# Patient Record
Sex: FEMALE | Race: OTHER | HISPANIC OR LATINO
[De-identification: names, ages, dates, MRNs, and addresses within clinical notes are randomized per-mention and may not be internally consistent; named-entity substitution may affect disease eponyms.]

---

## 2018-02-06 ENCOUNTER — APPOINTMENT (OUTPATIENT)
Dept: UROLOGY | Facility: CLINIC | Age: 48
End: 2018-02-06
Payer: COMMERCIAL

## 2018-02-06 VITALS — HEART RATE: 92 BPM | TEMPERATURE: 98.6 F | SYSTOLIC BLOOD PRESSURE: 116 MMHG | DIASTOLIC BLOOD PRESSURE: 79 MMHG

## 2018-02-06 DIAGNOSIS — N39.3 STRESS INCONTINENCE (FEMALE) (MALE): ICD-10-CM

## 2018-02-06 PROCEDURE — 99204 OFFICE O/P NEW MOD 45 MIN: CPT | Mod: 25

## 2018-02-06 PROCEDURE — 51798 US URINE CAPACITY MEASURE: CPT

## 2018-09-21 ENCOUNTER — APPOINTMENT (OUTPATIENT)
Dept: ORTHOPEDIC SURGERY | Facility: CLINIC | Age: 48
End: 2018-09-21
Payer: COMMERCIAL

## 2018-09-21 DIAGNOSIS — Z82.69 FAMILY HISTORY OF OTHER DISEASES OF THE MUSCULOSKELETAL SYSTEM AND CONNECTIVE TISSUE: ICD-10-CM

## 2018-09-21 DIAGNOSIS — Z78.9 OTHER SPECIFIED HEALTH STATUS: ICD-10-CM

## 2018-09-21 DIAGNOSIS — Z80.0 FAMILY HISTORY OF MALIGNANT NEOPLASM OF DIGESTIVE ORGANS: ICD-10-CM

## 2018-09-21 PROCEDURE — 20611 DRAIN/INJ JOINT/BURSA W/US: CPT | Mod: LT

## 2018-09-21 PROCEDURE — 99204 OFFICE O/P NEW MOD 45 MIN: CPT | Mod: 25

## 2018-11-02 ENCOUNTER — APPOINTMENT (OUTPATIENT)
Dept: ORTHOPEDIC SURGERY | Facility: CLINIC | Age: 48
End: 2018-11-02
Payer: COMMERCIAL

## 2018-11-02 VITALS — BODY MASS INDEX: 33.8 KG/M2 | HEIGHT: 64 IN | WEIGHT: 198 LBS

## 2018-11-02 DIAGNOSIS — S16.1XXA STRAIN OF MUSCLE, FASCIA AND TENDON AT NECK LEVEL, INITIAL ENCOUNTER: ICD-10-CM

## 2018-11-02 PROCEDURE — 99213 OFFICE O/P EST LOW 20 MIN: CPT

## 2018-12-04 ENCOUNTER — FORM ENCOUNTER (OUTPATIENT)
Age: 48
End: 2018-12-04

## 2018-12-05 ENCOUNTER — OUTPATIENT (OUTPATIENT)
Dept: OUTPATIENT SERVICES | Facility: HOSPITAL | Age: 48
LOS: 1 days | End: 2018-12-05

## 2018-12-05 ENCOUNTER — APPOINTMENT (OUTPATIENT)
Dept: MRI IMAGING | Facility: CLINIC | Age: 48
End: 2018-12-05
Payer: COMMERCIAL

## 2018-12-05 PROCEDURE — 73221 MRI JOINT UPR EXTREM W/O DYE: CPT | Mod: 26,LT

## 2018-12-14 ENCOUNTER — APPOINTMENT (OUTPATIENT)
Dept: ORTHOPEDIC SURGERY | Facility: CLINIC | Age: 48
End: 2018-12-14
Payer: COMMERCIAL

## 2018-12-14 VITALS — BODY MASS INDEX: 33.8 KG/M2 | WEIGHT: 198 LBS | HEIGHT: 64 IN

## 2018-12-14 PROCEDURE — 99214 OFFICE O/P EST MOD 30 MIN: CPT

## 2019-01-22 ENCOUNTER — APPOINTMENT (OUTPATIENT)
Dept: ORTHOPEDIC SURGERY | Facility: CLINIC | Age: 49
End: 2019-01-22
Payer: COMMERCIAL

## 2019-01-22 VITALS — BODY MASS INDEX: 35.68 KG/M2 | HEIGHT: 64 IN | WEIGHT: 209 LBS

## 2019-01-22 PROCEDURE — 99214 OFFICE O/P EST MOD 30 MIN: CPT

## 2019-01-23 NOTE — PHYSICAL EXAM
[de-identified] : The left shoulder has 170° passive forward elevation, 70° external rotation. She can fully actively raise the arm but this is painful. Strength of external rotation is normal but she does have subacromial tenderness and crepitus, positive for pain, and a positive impingement sign but no tenderness over the acromioclavicular joint.

## 2019-01-23 NOTE — DISCUSSION/SUMMARY
[Medication Risks Reviewed] : Medication risks reviewed [Surgical risks reviewed] : Surgical risks reviewed [de-identified] : Because she has had pain for over 6 months unresponsive to a full nonoperative treatment program including exercises, activity modifications, anti-inflammatory medications, and a cortisone injection, with an MRI documented full thickness tear of the rotator cuff, I believe she is an excellent candidate for arthroscopic repair of the rotator cuff.\par \par Using a shoulder model, I reviewed the anatomy of the rotator cuff and the pathophysiology of shoulder impingement syndrome ranging from inflammation, to fibrosis, to full thickness tears of the rotator cuff. I explained that a complete non-operative treatment program includes 3-4 months of activity modification to avoid those activities that exacerbate pain, shoulder rehabilitation exercises, anti-inflammatory medications, and a cortisone injection. Approximately 70% of patients are successfully treated in this way provided the rotator cuff is intact. Patients with full thickness tears of the rotator cuff are less likely to be adequately treated with non-operative treatment. I explained that shoulder arthroscopy with subacromial decompression provides excellent relief of pain for those patients who fail non-operative treatment. For those patients with full-thickness rotator cuff tears, subacromial decompression and repair of the rotator cuff is recommended.\par \par I reviewed that the indication for surgical treatment of rotator cuff disease is persistent pain that compromises activities of daily living and recreational activities following a full non-operative treatment program. Shoulder arthroscopy is performed under a regional interscalene block anesthesia on an ambulatory basis, and I reviewed the technique of subacromial bursa removal and anterior acromioplasty as well as details of repair of full-thickness rotator cuff tears. I also reviewed the importance of postoperative rehabilitation and the fact that recovery following subacromial decompression alone requires a minimum of 3-4 months before full overhead activity is resumed. Patient's requiring repair of full-thickness rotator cuff tears require up to 6-9 months of rehabilitation before returning to full and on limited overhead activities.\par \par Approximately 85-90% of patients achieve excellent pain relief and return of shoulder function following surgical treatment. I explained that the most important indicator of outcome is the size of the rotator cuff tear, if present. While relief of pain is consistent and predictable, shoulder strength for overhead activities is not and depends on the size of the rotator cuff tear itself and the quality of the rotator cuff muscles.\par \par Finally, I explained that a high percentage of rotator cuff tears that are repaired do not heal despite proper surgical technique and rehabilitation and that this is a function of the size of the rotator cuff tear and patient age, i.e. patients over 65 years old with larger tears have a higher percentage of non-healing of the tendons. However, up to 90% of patients whose rotator cuff repairs do not heal still have excellent outcomes and are quite happy with the procedure since they have excellent relief of pain and, therefore, there shoulder function is very much improved. Patients whose rotator cuff repairs do not heal may have some weakness with more vigorous overhead activities, but this weakness rarely compromises functional ability.\par \par I then summarized the indications for surgical treatment, the nature of the surgery, the alternatives of treatment, as well as the surgical risks which include infection, nerve injury, failure of healing of the rotator cuff tendons, and the possibility of unanticipated complications. I made sure that these issues were understood, and I then answered all questions.\par The risks, benefits, aftercare precautions, and alternatives of treatment were reviewed with her and all her questions were answered. She will schedule arthroscopic repair of the rotator cuff at her convenience. She knows that she will not be able to work for at least 6 weeks following surgery in an effort to allow healing of the repaired rotator cuff.\par \par Today her clinical left shoulder insert American is 44 on a scale of 100 indicating severe compromise of shoulder function.

## 2019-01-23 NOTE — HISTORY OF PRESENT ILLNESS
[de-identified] : Ms. Vogel returns today complaining of persistent left shoulder pain that has now lasted over 6 months. She continues to have pain in the left shoulder that interrupts sleep at night and compromise as overhead activities of daily living. 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 An MRI of the left shoulder performed on 12-5-18 shows a full-thickness tear of the rotator cuff.

## 2019-01-23 NOTE — CONSULT LETTER
[Dear  ___] : Dear  [unfilled], [FreeTextEntry1] : Today I had the pleasure of evaluating your very nice patient IRLANDA REEVES  who requested that I share my findings with you. I very much appreciate the referral. \par \par Please review my office note below and, needless to say, please call or email me with any questions or concerns.\par \par I appreciate the opportunity to participate in her care.\par \par Sincerely,\par \par Jose Madsen MD\par Director, Orthopaedic Surgery\par and Orthopaedic Strategic Initiatives \par [facility]\par Office: [phone]\par Cell: [phone]\par Email: [email or web address]\par Website: [email or web address] \par \par \par \par \par

## 2019-02-20 ENCOUNTER — APPOINTMENT (OUTPATIENT)
Age: 49
End: 2019-02-20
Payer: COMMERCIAL

## 2019-02-20 PROCEDURE — 29823 SHO ARTHRS SRG XTNSV DBRDMT: CPT | Mod: AS,LT

## 2019-02-20 PROCEDURE — 29826 SHO ARTHRS SRG DECOMPRESSION: CPT | Mod: LT

## 2019-02-20 PROCEDURE — 29827 SHO ARTHRS SRG RT8TR CUF RPR: CPT | Mod: AS,LT

## 2019-02-20 PROCEDURE — 29826 SHO ARTHRS SRG DECOMPRESSION: CPT | Mod: AS,LT

## 2019-02-20 PROCEDURE — 29827 SHO ARTHRS SRG RT8TR CUF RPR: CPT | Mod: LT

## 2019-02-20 PROCEDURE — 29823 SHO ARTHRS SRG XTNSV DBRDMT: CPT | Mod: LT

## 2019-03-01 ENCOUNTER — APPOINTMENT (OUTPATIENT)
Dept: ORTHOPEDIC SURGERY | Facility: CLINIC | Age: 49
End: 2019-03-01
Payer: COMMERCIAL

## 2019-03-01 PROCEDURE — 99024 POSTOP FOLLOW-UP VISIT: CPT

## 2019-03-03 NOTE — HISTORY OF PRESENT ILLNESS
[de-identified] : Ms. Vogel visits us today 9 days following left arthroscopic rotator cuff repair on 2-20-19

## 2019-03-03 NOTE — PHYSICAL EXAM
[de-identified] : The left shoulder incisions are healed and sutures were removed. External rotation 30°.

## 2019-03-03 NOTE — DISCUSSION/SUMMARY
[Medication Risks Reviewed] : Medication risks reviewed [Surgical risks reviewed] : Surgical risks reviewed [de-identified] : Doing well 9 days following arthroscopic repair of the left rotator cuff. I instructed her in pendulum exercises and advised her that she may allow the arch and ankle at her side at home but to continue to wear a sling when out of doors. She should return to in one month at which time forward elevation exercises will begin.

## 2019-03-03 NOTE — CONSULT LETTER
[Dear  ___] : Dear  [unfilled], [FreeTextEntry1] : Today I had the pleasure of evaluating your very nice patient IRLANDA REEVES  who requested that I share my findings with you. I very much appreciate the referral. \par \par Please review my office note below and, needless to say, please call or email me with any questions or concerns.\par \par She is doing very well 9 days following arthroscopic repair of her rotator cuff. I have included a copy of her operative report for your records.\par \par I appreciate the opportunity to participate in her care.\par \par Sincerely,\par \par Jose Madsen MD\par Director, Orthopaedic Surgery\par and Orthopaedic Strategic Initiatives \par [facility]\par Office: [phone]\par Cell: [phone]\par Email: [email or web address]\par Website: [email or web address] \par \par \par \par \par

## 2019-04-05 ENCOUNTER — APPOINTMENT (OUTPATIENT)
Dept: ORTHOPEDIC SURGERY | Facility: CLINIC | Age: 49
End: 2019-04-05
Payer: COMMERCIAL

## 2019-04-05 VITALS — WEIGHT: 209 LBS | HEIGHT: 64 IN | BODY MASS INDEX: 35.68 KG/M2

## 2019-04-05 PROCEDURE — 99024 POSTOP FOLLOW-UP VISIT: CPT

## 2019-04-05 RX ORDER — OXYCODONE AND ACETAMINOPHEN 5; 325 MG/1; MG/1
5-325 TABLET ORAL
Qty: 30 | Refills: 0 | Status: DISCONTINUED | COMMUNITY
Start: 2019-02-19 | End: 2019-04-05

## 2019-04-11 NOTE — CONSULT LETTER
[Dear  ___] : Dear  [unfilled], [FreeTextEntry1] : Today I had the pleasure of evaluating your very nice patient Sona Vogel  who requested that I share my findings with you. I very much appreciate the referral. \par \par Please review my office note below and, needless to say, please call or email me with any questions or concerns.\par \par I appreciate the opportunity to participate in her care.\par \par Sincerely,\par \par Jose Madsen MD\par Director, Orthopaedic Surgery\par and Orthopaedic Strategic Initiatives \par [facility]\par Office: [phone]\par Cell: [phone]\par Email: [email or web address] Website: [email or web address] \par \par \par \par

## 2019-04-11 NOTE — DISCUSSION/SUMMARY
[Medication Risks Reviewed] : Medication risks reviewed [Surgical risks reviewed] : Surgical risks reviewed [de-identified] : Doing well 6 weeks following arthroscopic left rotator cuff repair. I instructed her in-assisted range of motion exercises and gave her my home exercise sheet. I advised her to stop wearing the sling but to avoid active elevation above shoulder level until she has better strength.\par \par She should return in 6 weeks at which time a strengthening program will begin.

## 2019-04-11 NOTE — PHYSICAL EXAM
[de-identified] : The left shoulder has 140° passive forward elevation, 60° external rotation. There is no crepitus or pain with passive rotation of the glenohumeral joint and she has good strength of external rotation.

## 2019-04-11 NOTE — HISTORY OF PRESENT ILLNESS
[de-identified] : Ms. Vogel visits us today 6 weeks following left arthroscopic rotator cuff repair on 2-20-19 \par  \par

## 2019-05-17 ENCOUNTER — APPOINTMENT (OUTPATIENT)
Dept: ORTHOPEDIC SURGERY | Facility: CLINIC | Age: 49
End: 2019-05-17
Payer: COMMERCIAL

## 2019-05-17 VITALS — BODY MASS INDEX: 34.83 KG/M2 | WEIGHT: 204 LBS | HEIGHT: 64 IN

## 2019-05-17 PROCEDURE — 99024 POSTOP FOLLOW-UP VISIT: CPT

## 2019-05-18 NOTE — DISCUSSION/SUMMARY
[Surgical risks reviewed] : Surgical risks reviewed [Medication Risks Reviewed] : Medication risks reviewed [de-identified] : Doing extremely well 3 months following left rotator cuff repair. I instructed her in rotator cuff strengthening exercises that she can do at home and I gave her my home exercise sheet.\par \par I encouraged her to increase active use as comfort permits and to return for final followup in 3 months. I reminded her that full recovery following rotator cuff surgery require 6 months of postoperative rehabilitation, but that she is already doing extremely well at just 3 months.\par \par Today her clinical left shoulder [employer] score is 82 on the scale of 100 indicating good shoulder function and a marked improvement over her preoperative score of 44.

## 2019-05-18 NOTE — HISTORY OF PRESENT ILLNESS
[de-identified] : Ms. Vogel visits us today 3 months following left arthroscopic rotator cuff repair on 2-20-19 . 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

## 2019-05-18 NOTE — PHYSICAL EXAM
[de-identified] : Left shoulder has full active and passive range of motion without pain and normal strength of external rotation. She can fully actively raise the arm with minimal pain.

## 2019-05-18 NOTE — CONSULT LETTER
[Dear  ___] : Dear  [unfilled], [FreeTextEntry1] : Today I had the pleasure of evaluating your very nice patient IRLANDA REEVES  who requested that I share my findings with you. I very much appreciate the referral. \par \par Please review my office note below and, needless to say, please call or email me with any questions or concerns.\par \par I appreciate the opportunity to participate in her care.\par \par Sincerely,\par \par Jose Madsen MD\par Director, Orthopaedic Surgery\par and Orthopaedic Strategic Initiatives \[facility]\par Office: [phone]\par Cell: [phone]\par Email: [email or web address] Website: [email or web address] \par \par \par \par \par

## 2019-08-13 ENCOUNTER — APPOINTMENT (OUTPATIENT)
Dept: ORTHOPEDIC SURGERY | Facility: CLINIC | Age: 49
End: 2019-08-13
Payer: COMMERCIAL

## 2019-08-13 DIAGNOSIS — M75.102 UNSPECIFIED ROTATOR CUFF TEAR OR RUPTURE OF LEFT SHOULDER, NOT SPECIFIED AS TRAUMATIC: ICD-10-CM

## 2019-08-13 PROCEDURE — 99212 OFFICE O/P EST SF 10 MIN: CPT

## 2021-02-18 ENCOUNTER — APPOINTMENT (OUTPATIENT)
Dept: INTERVENTIONAL RADIOLOGY/VASCULAR | Facility: HOSPITAL | Age: 51
End: 2021-02-18
Payer: COMMERCIAL

## 2021-02-18 DIAGNOSIS — D64.9 ANEMIA, UNSPECIFIED: ICD-10-CM

## 2021-02-18 DIAGNOSIS — D25.9 LEIOMYOMA OF UTERUS, UNSPECIFIED: ICD-10-CM

## 2021-02-18 PROCEDURE — 99214 OFFICE O/P EST MOD 30 MIN: CPT | Mod: 95

## 2021-02-24 DIAGNOSIS — N80.0 ENDOMETRIOSIS OF UTERUS: ICD-10-CM

## 2021-02-24 PROBLEM — D25.9 FIBROID UTERUS: Status: ACTIVE | Noted: 2021-02-24

## 2021-02-24 PROBLEM — D64.9 ANEMIA: Status: ACTIVE | Noted: 2021-02-24

## 2021-02-24 NOTE — ASSESSMENT
[FreeTextEntry1] : 50 year old with presumed diagnosis of uterine adenomyosis.  Have scheduled MRI to evaluate uterus and determine if patient is candidate for UAE

## 2021-02-24 NOTE — CONSULT LETTER
[Dear  ___] : Dear ~POLO, [Consult Letter:] : I had the pleasure of evaluating your patient, [unfilled]. [Please see my note below.] : Please see my note below. [Consult Closing:] : Thank you very much for allowing me to participate in the care of this patient.  If you have any questions, please do not hesitate to contact me. [FreeTextEntry3] : Raghu Hoffmann MD, FSIR\par Chief Interventional Radiology\par [facility]\par [facility]\par  [Sincerely,] : Sincerely, [DrKenn  ___] : Dr. ENRIQUE [DrKenn ___] : Dr. ENRIQUE

## 2021-02-24 NOTE — HISTORY OF PRESENT ILLNESS
[FreeTextEntry1] : Ms Vogel is a 50 year old woman diagnosed with uterine adenomyosis approximately one year ago.  She has been experiencing menorrhagia since the end of 2020 lasting 7 days with clots.  She reports occasionally missing periods and has had 8 periods in the past 12 months.  Her severe anemia felt secondary to her menorrhagia is being treated with IV Iron infusion by Dr Ballard.  Denies metrorrhagia [Home] : at home, [unfilled] , at the time of the visit. [Medical Office: ([facility])___] : at the medical office located in  [Verbal consent obtained from patient] : the patient, [unfilled] [Menorrhagia] : ~T menorrhagia [Pelvic Pain] : pelvic pain [Urinary Frequency] : no change in urinary frequency [Pressure] : no pressure [Bloating] : no bloating [Myomectomy] : no myomectomy [D & C] : no dilation and curettage [Last Menstrual Period (___)] : Last menstrual period [unfilled] [Bleeding In Between Periods] : no bleeding in between periods [Menopausal Symptoms] : no complaints of menopausal symptoms [Post Menopause] : not post menopausal [G ___] : G [unfilled] [P___] : P [unfilled] [Vaginal___] : vaginal [unfilled] [Plans for future pregnancies within 2 years] : does not plan to have future pregnancies within 2 years [Surgically Sterile] : surgically sterile [Stable] : stable [de-identified] : uterues 9.9 x 5.9 x 6.4 with heterogeneous uterus no discrete fibroid.

## 2021-03-08 ENCOUNTER — OUTPATIENT (OUTPATIENT)
Dept: OUTPATIENT SERVICES | Facility: HOSPITAL | Age: 51
LOS: 1 days | End: 2021-03-08
Payer: COMMERCIAL

## 2021-03-08 ENCOUNTER — RESULT REVIEW (OUTPATIENT)
Age: 51
End: 2021-03-08

## 2021-03-08 ENCOUNTER — APPOINTMENT (OUTPATIENT)
Dept: MRI IMAGING | Facility: HOSPITAL | Age: 51
End: 2021-03-08
Payer: COMMERCIAL

## 2021-03-08 PROCEDURE — 72197 MRI PELVIS W/O & W/DYE: CPT | Mod: 26

## 2021-03-08 PROCEDURE — 72197 MRI PELVIS W/O & W/DYE: CPT

## 2021-03-08 PROCEDURE — A9585: CPT

## 2021-03-20 ENCOUNTER — LABORATORY RESULT (OUTPATIENT)
Age: 51
End: 2021-03-20

## 2021-03-23 ENCOUNTER — OUTPATIENT (OUTPATIENT)
Dept: OUTPATIENT SERVICES | Facility: HOSPITAL | Age: 51
LOS: 1 days | End: 2021-03-23
Payer: COMMERCIAL

## 2021-03-23 ENCOUNTER — RESULT REVIEW (OUTPATIENT)
Age: 51
End: 2021-03-23

## 2021-03-23 ENCOUNTER — APPOINTMENT (OUTPATIENT)
Dept: INTERVENTIONAL RADIOLOGY/VASCULAR | Facility: HOSPITAL | Age: 51
End: 2021-03-23
Payer: COMMERCIAL

## 2021-03-23 PROCEDURE — 37242 VASC EMBOLIZE/OCCLUDE ARTERY: CPT

## 2021-03-23 PROCEDURE — 36247 INS CATH ABD/L-EXT ART 3RD: CPT

## 2021-03-23 PROCEDURE — C1887: CPT

## 2021-03-23 PROCEDURE — C1769: CPT

## 2021-03-23 PROCEDURE — C1894: CPT

## 2021-03-23 PROCEDURE — C1889: CPT

## 2021-03-23 RX ORDER — ONDANSETRON 8 MG/1
1 TABLET, FILM COATED ORAL
Qty: 9 | Refills: 0
Start: 2021-03-23 | End: 2021-03-25

## 2021-03-23 RX ORDER — OXYCODONE HYDROCHLORIDE 5 MG/1
1 TABLET ORAL
Qty: 20 | Refills: 0
Start: 2021-03-23 | End: 2021-03-27

## 2021-03-23 RX ORDER — IBUPROFEN 200 MG
1 TABLET ORAL
Qty: 40 | Refills: 0
Start: 2021-03-23 | End: 2021-04-01

## 2021-04-16 ENCOUNTER — APPOINTMENT (OUTPATIENT)
Dept: INTERVENTIONAL RADIOLOGY/VASCULAR | Facility: HOSPITAL | Age: 51
End: 2021-04-16

## 2021-07-06 ENCOUNTER — OUTPATIENT (OUTPATIENT)
Dept: OUTPATIENT SERVICES | Facility: HOSPITAL | Age: 51
LOS: 1 days | End: 2021-07-06
Payer: COMMERCIAL

## 2021-07-06 ENCOUNTER — RESULT REVIEW (OUTPATIENT)
Age: 51
End: 2021-07-06

## 2021-07-06 ENCOUNTER — APPOINTMENT (OUTPATIENT)
Dept: ULTRASOUND IMAGING | Facility: HOSPITAL | Age: 51
End: 2021-07-06

## 2021-07-06 PROCEDURE — 76830 TRANSVAGINAL US NON-OB: CPT | Mod: 26

## 2021-07-06 PROCEDURE — 76830 TRANSVAGINAL US NON-OB: CPT

## 2021-07-06 PROCEDURE — 76856 US EXAM PELVIC COMPLETE: CPT | Mod: 26,59

## 2021-07-06 PROCEDURE — 76856 US EXAM PELVIC COMPLETE: CPT

## 2024-04-02 ENCOUNTER — APPOINTMENT (OUTPATIENT)
Dept: ORTHOPEDIC SURGERY | Facility: CLINIC | Age: 54
End: 2024-04-02
Payer: COMMERCIAL

## 2024-04-02 VITALS — BODY MASS INDEX: 31.92 KG/M2 | HEIGHT: 64 IN | WEIGHT: 187 LBS

## 2024-04-02 PROCEDURE — 20610 DRAIN/INJ JOINT/BURSA W/O US: CPT | Mod: RT

## 2024-04-02 PROCEDURE — 99204 OFFICE O/P NEW MOD 45 MIN: CPT | Mod: 25

## 2024-04-02 RX ORDER — MELOXICAM 15 MG/1
15 TABLET ORAL
Qty: 30 | Refills: 1 | Status: ACTIVE | COMMUNITY
Start: 2024-04-02 | End: 1900-01-01

## 2024-04-02 RX ORDER — LOSARTAN POTASSIUM AND HYDROCHLOROTHIAZIDE 25; 100 MG/1; MG/1
100-25 TABLET ORAL
Refills: 0 | Status: ACTIVE | COMMUNITY

## 2024-04-02 RX ORDER — HYDROCHLOROTHIAZIDE 12.5 MG/1
CAPSULE ORAL
Refills: 0 | Status: DISCONTINUED | COMMUNITY
End: 2024-04-02

## 2024-04-02 RX ORDER — AMLODIPINE BESYLATE 10 MG/1
10 TABLET ORAL
Qty: 30 | Refills: 0 | Status: DISCONTINUED | COMMUNITY
Start: 2019-02-19 | End: 2024-04-02

## 2024-05-16 ENCOUNTER — APPOINTMENT (OUTPATIENT)
Dept: ORTHOPEDIC SURGERY | Facility: CLINIC | Age: 54
End: 2024-05-16
Payer: COMMERCIAL

## 2024-05-16 VITALS — WEIGHT: 187 LBS | HEIGHT: 64 IN | BODY MASS INDEX: 31.92 KG/M2

## 2024-05-16 DIAGNOSIS — M75.41 IMPINGEMENT SYNDROME OF RIGHT SHOULDER: ICD-10-CM

## 2024-05-16 DIAGNOSIS — M75.121 COMPLETE ROTATOR CUFF TEAR OR RUPTURE OF RIGHT SHOULDER, NOT SPECIFIED AS TRAUMATIC: ICD-10-CM

## 2024-05-16 PROCEDURE — 99212 OFFICE O/P EST SF 10 MIN: CPT

## 2024-05-31 ENCOUNTER — OUTPATIENT (OUTPATIENT)
Dept: OUTPATIENT SERVICES | Facility: HOSPITAL | Age: 54
LOS: 1 days | End: 2024-05-31

## 2024-05-31 ENCOUNTER — APPOINTMENT (OUTPATIENT)
Dept: ORTHOPEDIC SURGERY | Facility: CLINIC | Age: 54
End: 2024-05-31
Payer: COMMERCIAL

## 2024-05-31 ENCOUNTER — APPOINTMENT (OUTPATIENT)
Dept: MRI IMAGING | Facility: CLINIC | Age: 54
End: 2024-05-31
Payer: COMMERCIAL

## 2024-05-31 ENCOUNTER — RESULT REVIEW (OUTPATIENT)
Age: 54
End: 2024-05-31

## 2024-05-31 ENCOUNTER — OUTPATIENT (OUTPATIENT)
Dept: OUTPATIENT SERVICES | Facility: HOSPITAL | Age: 54
LOS: 1 days | End: 2024-05-31
Payer: COMMERCIAL

## 2024-05-31 VITALS
SYSTOLIC BLOOD PRESSURE: 112 MMHG | HEIGHT: 64 IN | BODY MASS INDEX: 31.92 KG/M2 | HEART RATE: 61 BPM | OXYGEN SATURATION: 99 % | WEIGHT: 187 LBS | DIASTOLIC BLOOD PRESSURE: 77 MMHG

## 2024-05-31 DIAGNOSIS — Z80.0 FAMILY HISTORY OF MALIGNANT NEOPLASM OF DIGESTIVE ORGANS: ICD-10-CM

## 2024-05-31 DIAGNOSIS — M23.300 OTHER MENISCUS DERANGEMENTS, UNSPECIFIED LATERAL MENISCUS, RIGHT KNEE: ICD-10-CM

## 2024-05-31 DIAGNOSIS — M17.11 UNILATERAL PRIMARY OSTEOARTHRITIS, RIGHT KNEE: ICD-10-CM

## 2024-05-31 DIAGNOSIS — Z86.79 PERSONAL HISTORY OF OTHER DISEASES OF THE CIRCULATORY SYSTEM: ICD-10-CM

## 2024-05-31 PROCEDURE — 72170 X-RAY EXAM OF PELVIS: CPT | Mod: 26

## 2024-05-31 PROCEDURE — 73564 X-RAY EXAM KNEE 4 OR MORE: CPT

## 2024-05-31 PROCEDURE — 99204 OFFICE O/P NEW MOD 45 MIN: CPT

## 2024-05-31 PROCEDURE — 72170 X-RAY EXAM OF PELVIS: CPT

## 2024-05-31 PROCEDURE — 73564 X-RAY EXAM KNEE 4 OR MORE: CPT | Mod: 26,50

## 2024-05-31 PROCEDURE — 73221 MRI JOINT UPR EXTREM W/O DYE: CPT | Mod: 26,RT

## 2024-05-31 RX ORDER — HYALURONATE SODIUM 20 MG/2 ML
20 SYRINGE (ML) INTRAARTICULAR
Qty: 1 | Refills: 0 | Status: ACTIVE | OUTPATIENT
Start: 2024-05-31

## 2024-06-04 PROBLEM — Z80.0 FAMILY HISTORY OF LYNCH SYNDROME: Status: ACTIVE | Noted: 2024-05-31

## 2024-06-04 PROBLEM — Z86.79 HISTORY OF HYPERTENSION: Status: RESOLVED | Noted: 2024-05-31 | Resolved: 2024-06-04

## 2024-06-04 NOTE — HISTORY OF PRESENT ILLNESS
[Pain Location] : pain [] : right & left knee [5] : a current pain level of 5/10 [Constant] : ~He/She~ states the symptoms seem to be constant [Bending] : worsened by bending [Walking] : worsened by walking [Acetaminophen] : relieved by acetaminophen [NSAIDs] : relieved by nonsteroidal anti-inflammatory drugs [Rest] : relieved by rest [de-identified] : IMP: 54 year old female presenting for initial evaluation of of right knee pain. She reports constant anterior right knee pain and buckling episodes. She has some left knee pain but it is relatively mild. She is participating in PT and does stationary biking. She also reports that the is on her feet all day. She has CSI injections prior to her mensicectomy which she reports provided little relief.   Surgical history of a right partial meniscectomy at [place] in 2021, which she believed may have been her medial meniscus. She has not had follow up since her post-operative visit. She has a subacromial decompression of the right shoulder scheduled for July 2024 with Dr Madsen.  [de-identified] : sharp, burning, stabbing

## 2024-06-04 NOTE — ADDENDUM
[FreeTextEntry1] : I, Basilia Villalta, documented this note as a scribe on behalf of Dr. Gary Jensen on 05/31/2024.

## 2024-06-04 NOTE — END OF VISIT
[FreeTextEntry3] : All medical record entries made by the Scribe were at my, Dr. Gary Jensen, direction and personally dictated by me on 05/31/2024. I have reviewed the chart and agree that the record accurately reflects my personal performance of the history, physical exam, assessment and plan. I have alsopersonally directed, reviewed, and agreed with the chart.

## 2024-06-04 NOTE — DISCUSSION/SUMMARY
[de-identified] : IMP: 54 year old female with right knee chondromalacia patella as well as predominantly lateral compartment osteoarthritis.  - We will proceed with conservative treatment at this time, including PT, HEP, Tylenol and naproxen as needed.  - We will obtain a right knee noncontrast MRi to determine the status of her lateral meniscus and to screen out any further osteochondral pathology.  - She declined a right knee CSI today citing poor relief in the past.  - We will apply for right knee HA injection authorization to be administered once authorized. - I discouraged her from seeking arthroplasty as she has no radiographically severe arthritis, but we may consider other surgical modalities based on the results of MRI.

## 2024-06-04 NOTE — PHYSICAL EXAM
[de-identified] : General appearance: well nourished and hydrated, pleasant, alert and oriented x 3, cooperative. HEENT: normocephalic, EOM intact, wearing mask, external auditory canal clear. Cardiovascular: no lower leg edema, no varicosities, dorsalis pedis pulses palpable and symmetric. Lymphatics: no palpable lymphadenopathy, no lymphedema. Neurologic: sensation is normal, no muscle weakness in upper or lower extremities, patella tendon reflexes present and symmetric. Dermatologic: skin moist, warm, no rash. Spine: cervical spine with normal lordosis and painless range of motion, thoracic spine with normal kyphosis and painless range of motion, lumbosacral spine with normal lordosis and painless range of motion.  No tenderness to palpation along midline spine and paraspinal musculature.  Sacroiliac joints nontender bilaterally. Negative SLR and crossed SLR tests bilaterally. Gait: Demonstrates a stable nonantalgic gait pattern without the use of an assistive device.   Right knee: - Focal soft tissue swelling: none - Ecchymosis: none - Erythema: none - Effusion: trace, No palpable Baker's cyst - Wounds: well-healed arthroscopic portals, benign appearing - Alignment: normal - Tenderness: Lateral greater than medial joint line tenderness to palpation. Negative patellar compression test. - ROM: 0-140 - Collateral laxity: none - Cruciate laxity: none - Meniscal signs: negative Agueda and Aicha - Popliteal angle (degrees): 30 - Quad strength: 5/5 [de-identified] :  AP pelvis and 4 views of the bilateral knees (weightbearing AP, weightbearing Arreaga, weightbearing lateral, and Sunrise) were interpreted by me and reviewed with the patient.  Location of imaging: [facility] Date of exam: 05/31/2024  Pelvic alignment: normal  Bilateral hips --  Arthritis: None Deformity: None Osteonecrosis: None   Right knee --  Alignment: normal Arthritis: mild to moderate lateral compartment osteoarthritis, Kellgren & Raúl 2 Patellar height: normal Patellar tracking: central  Left knee --  Alignment: normal Arthritis: none Patellar height: normal Patellar tracking: central

## 2024-07-23 ENCOUNTER — APPOINTMENT (OUTPATIENT)
Dept: ORTHOPEDIC SURGERY | Facility: CLINIC | Age: 54
End: 2024-07-23
Payer: COMMERCIAL

## 2024-07-23 VITALS
SYSTOLIC BLOOD PRESSURE: 116 MMHG | DIASTOLIC BLOOD PRESSURE: 81 MMHG | HEART RATE: 53 BPM | OXYGEN SATURATION: 97 % | WEIGHT: 187 LBS | HEIGHT: 64 IN | BODY MASS INDEX: 31.92 KG/M2

## 2024-07-23 DIAGNOSIS — M23.300 OTHER MENISCUS DERANGEMENTS, UNSPECIFIED LATERAL MENISCUS, RIGHT KNEE: ICD-10-CM

## 2024-07-23 PROCEDURE — 20610 DRAIN/INJ JOINT/BURSA W/O US: CPT | Mod: RT

## 2024-07-23 RX ORDER — CELECOXIB 200 MG/1
200 CAPSULE ORAL
Qty: 30 | Refills: 0 | Status: ACTIVE | COMMUNITY
Start: 2024-07-23 | End: 1900-01-01

## 2024-07-23 RX ORDER — OXYCODONE 5 MG/1
5 TABLET ORAL EVERY 6 HOURS
Qty: 20 | Refills: 0 | Status: ACTIVE | COMMUNITY
Start: 2024-07-23 | End: 1900-01-01

## 2024-07-23 RX ORDER — ACETAMINOPHEN 500 MG/1
500 TABLET ORAL 4 TIMES DAILY
Qty: 112 | Refills: 0 | Status: ACTIVE | COMMUNITY
Start: 2024-07-23 | End: 1900-01-01

## 2024-07-23 NOTE — PROCEDURE
[de-identified] : Gel-One right knee joint injection Lot: [id number] Exp: 2026-02-12 Manu: Jose Raul NDC: -666-00   Procedure: Knee joint injection Laterality:  RIGHT Indication: Osteoarthritis - discussed with patient Skin prep: alcohol and chlorhexidine Anesthesia: ethyl chloride spray Needle: 20-gauge Portal: superolateral Aspiration: none Injectate: Gel- 1 Dressing: Band-aid Complications: None  - Advised patient to contact the office in 5months to submit authorization for right knee GEL - one injections

## 2024-07-24 ENCOUNTER — APPOINTMENT (OUTPATIENT)
Age: 54
End: 2024-07-24
Payer: COMMERCIAL

## 2024-07-24 PROCEDURE — 29827 SHO ARTHRS SRG RT8TR CUF RPR: CPT | Mod: RT

## 2024-07-24 PROCEDURE — 29823 SHO ARTHRS SRG XTNSV DBRDMT: CPT | Mod: AS,RT

## 2024-07-24 PROCEDURE — 29826 SHO ARTHRS SRG DECOMPRESSION: CPT | Mod: AS,RT

## 2024-07-24 PROCEDURE — 29826 SHO ARTHRS SRG DECOMPRESSION: CPT | Mod: RT

## 2024-07-24 PROCEDURE — 29823 SHO ARTHRS SRG XTNSV DBRDMT: CPT | Mod: RT

## 2024-07-24 PROCEDURE — 29827 SHO ARTHRS SRG RT8TR CUF RPR: CPT | Mod: AS,RT

## 2024-07-26 ENCOUNTER — APPOINTMENT (OUTPATIENT)
Dept: ORTHOPEDIC SURGERY | Facility: CLINIC | Age: 54
End: 2024-07-26

## 2024-08-01 ENCOUNTER — APPOINTMENT (OUTPATIENT)
Dept: ORTHOPEDIC SURGERY | Facility: CLINIC | Age: 54
End: 2024-08-01
Payer: COMMERCIAL

## 2024-08-01 DIAGNOSIS — M75.121 COMPLETE ROTATOR CUFF TEAR OR RUPTURE OF RIGHT SHOULDER, NOT SPECIFIED AS TRAUMATIC: ICD-10-CM

## 2024-08-01 PROCEDURE — 99024 POSTOP FOLLOW-UP VISIT: CPT

## 2024-09-03 ENCOUNTER — APPOINTMENT (OUTPATIENT)
Dept: ORTHOPEDIC SURGERY | Facility: CLINIC | Age: 54
End: 2024-09-03
Payer: COMMERCIAL

## 2024-09-03 DIAGNOSIS — M75.121 COMPLETE ROTATOR CUFF TEAR OR RUPTURE OF RIGHT SHOULDER, NOT SPECIFIED AS TRAUMATIC: ICD-10-CM

## 2024-09-03 PROCEDURE — 99024 POSTOP FOLLOW-UP VISIT: CPT

## 2024-10-15 ENCOUNTER — APPOINTMENT (OUTPATIENT)
Dept: ORTHOPEDIC SURGERY | Facility: CLINIC | Age: 54
End: 2024-10-15
Payer: COMMERCIAL

## 2024-10-15 VITALS — HEIGHT: 64 IN | WEIGHT: 187 LBS | BODY MASS INDEX: 31.92 KG/M2

## 2024-10-15 DIAGNOSIS — M75.121 COMPLETE ROTATOR CUFF TEAR OR RUPTURE OF RIGHT SHOULDER, NOT SPECIFIED AS TRAUMATIC: ICD-10-CM

## 2024-10-15 PROCEDURE — 99024 POSTOP FOLLOW-UP VISIT: CPT

## 2024-12-05 ENCOUNTER — APPOINTMENT (OUTPATIENT)
Dept: ORTHOPEDIC SURGERY | Facility: CLINIC | Age: 54
End: 2024-12-05
Payer: COMMERCIAL

## 2024-12-05 VITALS — HEIGHT: 64 IN | WEIGHT: 187 LBS | BODY MASS INDEX: 31.92 KG/M2

## 2024-12-05 DIAGNOSIS — M75.121 COMPLETE ROTATOR CUFF TEAR OR RUPTURE OF RIGHT SHOULDER, NOT SPECIFIED AS TRAUMATIC: ICD-10-CM

## 2024-12-05 PROCEDURE — 99212 OFFICE O/P EST SF 10 MIN: CPT

## 2025-01-03 ENCOUNTER — APPOINTMENT (OUTPATIENT)
Dept: ORTHOPEDIC SURGERY | Facility: CLINIC | Age: 55
End: 2025-01-03
Payer: COMMERCIAL

## 2025-01-03 VITALS
HEIGHT: 64 IN | WEIGHT: 197 LBS | BODY MASS INDEX: 33.63 KG/M2 | SYSTOLIC BLOOD PRESSURE: 107 MMHG | HEART RATE: 67 BPM | DIASTOLIC BLOOD PRESSURE: 74 MMHG | OXYGEN SATURATION: 97 %

## 2025-01-03 DIAGNOSIS — M17.11 UNILATERAL PRIMARY OSTEOARTHRITIS, RIGHT KNEE: ICD-10-CM

## 2025-01-03 DIAGNOSIS — M23.300 OTHER MENISCUS DERANGEMENTS, UNSPECIFIED LATERAL MENISCUS, RIGHT KNEE: ICD-10-CM

## 2025-01-03 PROCEDURE — 99214 OFFICE O/P EST MOD 30 MIN: CPT | Mod: 25

## 2025-01-03 PROCEDURE — 20610 DRAIN/INJ JOINT/BURSA W/O US: CPT | Mod: RT

## 2025-01-10 ENCOUNTER — NON-APPOINTMENT (OUTPATIENT)
Age: 55
End: 2025-01-10

## 2025-01-21 ENCOUNTER — APPOINTMENT (OUTPATIENT)
Dept: ORTHOPEDIC SURGERY | Facility: CLINIC | Age: 55
End: 2025-01-21

## 2025-01-28 ENCOUNTER — APPOINTMENT (OUTPATIENT)
Dept: ORTHOPEDIC SURGERY | Facility: CLINIC | Age: 55
End: 2025-01-28
Payer: COMMERCIAL

## 2025-01-28 VITALS
SYSTOLIC BLOOD PRESSURE: 106 MMHG | BODY MASS INDEX: 33.63 KG/M2 | HEIGHT: 64 IN | HEART RATE: 60 BPM | OXYGEN SATURATION: 96 % | WEIGHT: 197 LBS | DIASTOLIC BLOOD PRESSURE: 72 MMHG

## 2025-01-28 DIAGNOSIS — M23.300 OTHER MENISCUS DERANGEMENTS, UNSPECIFIED LATERAL MENISCUS, RIGHT KNEE: ICD-10-CM

## 2025-01-28 PROCEDURE — 99214 OFFICE O/P EST MOD 30 MIN: CPT

## 2025-02-25 ENCOUNTER — APPOINTMENT (OUTPATIENT)
Dept: ORTHOPEDIC SURGERY | Facility: CLINIC | Age: 55
End: 2025-02-25

## 2025-03-18 ENCOUNTER — APPOINTMENT (OUTPATIENT)
Dept: ORTHOPEDIC SURGERY | Facility: CLINIC | Age: 55
End: 2025-03-18
Payer: COMMERCIAL

## 2025-03-18 ENCOUNTER — RESULT REVIEW (OUTPATIENT)
Age: 55
End: 2025-03-18

## 2025-03-18 ENCOUNTER — OUTPATIENT (OUTPATIENT)
Dept: OUTPATIENT SERVICES | Facility: HOSPITAL | Age: 55
LOS: 1 days | End: 2025-03-18
Payer: COMMERCIAL

## 2025-03-18 DIAGNOSIS — M17.11 UNILATERAL PRIMARY OSTEOARTHRITIS, RIGHT KNEE: ICD-10-CM

## 2025-03-18 PROCEDURE — 77073 BONE LENGTH STUDIES: CPT | Mod: 26

## 2025-03-18 PROCEDURE — 77073 BONE LENGTH STUDIES: CPT

## 2025-03-18 PROCEDURE — 99215 OFFICE O/P EST HI 40 MIN: CPT

## 2025-04-01 ENCOUNTER — APPOINTMENT (OUTPATIENT)
Dept: ORTHOPEDIC SURGERY | Facility: CLINIC | Age: 55
End: 2025-04-01

## 2025-04-08 ENCOUNTER — NON-APPOINTMENT (OUTPATIENT)
Age: 55
End: 2025-04-08

## 2025-04-09 ENCOUNTER — NON-APPOINTMENT (OUTPATIENT)
Age: 55
End: 2025-04-09

## 2025-04-22 DIAGNOSIS — M23.300 OTHER MENISCUS DERANGEMENTS, UNSPECIFIED LATERAL MENISCUS, RIGHT KNEE: ICD-10-CM

## 2025-04-22 DIAGNOSIS — Z47.1 AFTERCARE FOLLOWING JOINT REPLACEMENT SURGERY: ICD-10-CM

## 2025-04-22 DIAGNOSIS — M17.11 UNILATERAL PRIMARY OSTEOARTHRITIS, RIGHT KNEE: ICD-10-CM

## 2025-04-22 DIAGNOSIS — Z96.651 AFTERCARE FOLLOWING JOINT REPLACEMENT SURGERY: ICD-10-CM

## 2025-04-22 RX ORDER — PANTOPRAZOLE 40 MG/1
40 TABLET, DELAYED RELEASE ORAL DAILY
Qty: 30 | Refills: 2 | Status: ACTIVE | COMMUNITY
Start: 2025-04-22 | End: 1900-01-01

## 2025-04-22 RX ORDER — ASPIRIN 81 MG/1
81 TABLET, DELAYED RELEASE ORAL
Qty: 60 | Refills: 0 | Status: ACTIVE | COMMUNITY
Start: 2025-04-22 | End: 1900-01-01

## 2025-04-22 RX ORDER — POVIDONE-IODINE 7.5 %
1 SOLUTION, NON-ORAL TOPICAL ONCE
Refills: 0 | Status: COMPLETED | OUTPATIENT
Start: 2025-04-24 | End: 2025-04-24

## 2025-04-22 RX ORDER — OXYCODONE 5 MG/1
5 TABLET ORAL
Qty: 50 | Refills: 0 | Status: ACTIVE | COMMUNITY
Start: 2025-04-22 | End: 1900-01-01

## 2025-04-22 RX ORDER — ACETAMINOPHEN 500 MG/1
500 TABLET ORAL
Qty: 180 | Refills: 1 | Status: ACTIVE | COMMUNITY
Start: 2025-04-22 | End: 1900-01-01

## 2025-04-22 RX ORDER — CELECOXIB 200 MG/1
200 CAPSULE ORAL
Qty: 60 | Refills: 2 | Status: ACTIVE | COMMUNITY
Start: 2025-04-22 | End: 1900-01-01

## 2025-04-22 NOTE — H&P ADULT - PATIENT'S PREFERRED PRONOUN
Outreach attempt was made to schedule an Annual Wellness Visit. This was the first attempt. Contact was not made, left message.   Her/She

## 2025-04-22 NOTE — H&P ADULT - PROBLEM SELECTOR PLAN 1
Admit to Orthopaedic Service.  Presents today for elective  RIGHT TKR   Pt medically stable and cleared for procedure today by Dr. Mcdaniel Admit to Orthopaedic Service.  Presents today for elective right TKR   Pt medically stable and cleared for procedure today by Dr. Mcdaniel

## 2025-04-22 NOTE — H&P ADULT - NSHPLABSRESULTS_GEN_ALL_CORE
preop cbc/bmp/coags/ua wnl per medical clearance  Cr 0.87  H/H 11.8/36.7  HGA1C 5.4%  EKG- sinus bradycardia

## 2025-04-22 NOTE — H&P ADULT - HISTORY OF PRESENT ILLNESS
56yo f c/o right knee pain x   Presents today for elective RIGHT TKR.   Has the patient used any narcotic more than 90 days prior to the date of surgery? YES or NO  54yo F c/o right knee pain x many years. Pt notes she had a knee arthroscopy about 4 years ago that helped her pain for some time, but now it has progressively increased. Pain persists despite conservative measures. She does not take anything for pain at home. She ambulates with no assistance. She denies numbness, tingling, paresthesias to BLE.   Presents today for elective RIGHT TKR.   Has the patient used any narcotic more than 90 days prior to the date of surgery? No- pt denies taking anything for pain.

## 2025-04-22 NOTE — H&P ADULT - NSHPPHYSICALEXAM_GEN_ALL_CORE
General:  PE:  Decreased ROM secondary to pain. Rest of PE per medical clearance. PE: Gen: NAD, alert, oriented, pleasant, comfortable  MSK: Decreased ROM right knee secondary to pain.   MSK: No deformity or open wounds. No rashes or lesions. EHL/TA/GS/FHL 5/5 BLE. Sensation intact and equal BLE. Skin warm and well perfused. DP palpable BLE. Cap refill brisk. Calves soft, nontender BLE.   Rest of PE per medical clearance.

## 2025-04-23 VITALS
OXYGEN SATURATION: 95 % | SYSTOLIC BLOOD PRESSURE: 131 MMHG | RESPIRATION RATE: 18 BRPM | WEIGHT: 195.77 LBS | TEMPERATURE: 97 F | HEIGHT: 64 IN | HEART RATE: 62 BPM | DIASTOLIC BLOOD PRESSURE: 82 MMHG

## 2025-04-23 NOTE — ASU PATIENT PROFILE, ADULT - NSICDXPASTSURGICALHX_GEN_ALL_CORE_FT
PAST SURGICAL HISTORY:  Fibroids     H/O meniscectomy of right knee     H/O shoulder surgery B/L    H/O total cystectomy abdomen    H/O tubal ligation 1998

## 2025-04-24 ENCOUNTER — APPOINTMENT (OUTPATIENT)
Dept: ORTHOPEDIC SURGERY | Facility: HOSPITAL | Age: 55
End: 2025-04-24

## 2025-04-24 ENCOUNTER — TRANSCRIPTION ENCOUNTER (OUTPATIENT)
Age: 55
End: 2025-04-24

## 2025-04-24 ENCOUNTER — INPATIENT (INPATIENT)
Facility: HOSPITAL | Age: 55
LOS: 0 days | Discharge: HOME HEALTH | DRG: 470 | End: 2025-04-25
Attending: ORTHOPAEDIC SURGERY | Admitting: ORTHOPAEDIC SURGERY
Payer: COMMERCIAL

## 2025-04-24 DIAGNOSIS — I10 ESSENTIAL (PRIMARY) HYPERTENSION: ICD-10-CM

## 2025-04-24 DIAGNOSIS — M19.90 UNSPECIFIED OSTEOARTHRITIS, UNSPECIFIED SITE: ICD-10-CM

## 2025-04-24 DIAGNOSIS — D21.9 BENIGN NEOPLASM OF CONNECTIVE AND OTHER SOFT TISSUE, UNSPECIFIED: Chronic | ICD-10-CM

## 2025-04-24 DIAGNOSIS — Z90.6 ACQUIRED ABSENCE OF OTHER PARTS OF URINARY TRACT: Chronic | ICD-10-CM

## 2025-04-24 DIAGNOSIS — Z98.890 OTHER SPECIFIED POSTPROCEDURAL STATES: Chronic | ICD-10-CM

## 2025-04-24 DIAGNOSIS — Z98.51 TUBAL LIGATION STATUS: Chronic | ICD-10-CM

## 2025-04-24 PROCEDURE — 20985 CPTR-ASST DIR MS PX: CPT

## 2025-04-24 PROCEDURE — 27447 TOTAL KNEE ARTHROPLASTY: CPT | Mod: RT

## 2025-04-24 PROCEDURE — 73560 X-RAY EXAM OF KNEE 1 OR 2: CPT | Mod: 26,RT

## 2025-04-24 DEVICE — ZIMMER FEMALE HEX SCREW MAGNETIC 2.5MM X 25MM: Type: IMPLANTABLE DEVICE | Status: FUNCTIONAL

## 2025-04-24 DEVICE — ZIMMER/NEXGEN SMOOTH PIN 3.2X75MM: Type: IMPLANTABLE DEVICE | Status: FUNCTIONAL

## 2025-04-24 DEVICE — IMPLANTABLE DEVICE: Type: IMPLANTABLE DEVICE | Status: FUNCTIONAL

## 2025-04-24 DEVICE — ZIMMER/NEXGEN HEX HEAD SCREW 3.5MM: Type: IMPLANTABLE DEVICE | Status: FUNCTIONAL

## 2025-04-24 DEVICE — SURF ART PERSONA RT 10-11 EF 11MM: Type: IMPLANTABLE DEVICE | Status: FUNCTIONAL

## 2025-04-24 RX ORDER — HYDROMORPHONE/SOD CHLOR,ISO/PF 2 MG/10 ML
0.5 SYRINGE (ML) INJECTION
Refills: 0 | Status: DISCONTINUED | OUTPATIENT
Start: 2025-04-24 | End: 2025-04-25

## 2025-04-24 RX ORDER — ASPIRIN 325 MG
81 TABLET ORAL EVERY 12 HOURS
Refills: 0 | Status: DISCONTINUED | OUTPATIENT
Start: 2025-04-25 | End: 2025-04-25

## 2025-04-24 RX ORDER — ASPIRIN 325 MG
1 TABLET ORAL
Qty: 0 | Refills: 0 | DISCHARGE
Start: 2025-04-24

## 2025-04-24 RX ORDER — KETOROLAC TROMETHAMINE 30 MG/ML
15 INJECTION, SOLUTION INTRAMUSCULAR; INTRAVENOUS EVERY 6 HOURS
Refills: 0 | Status: DISCONTINUED | OUTPATIENT
Start: 2025-04-24 | End: 2025-04-25

## 2025-04-24 RX ORDER — OXYCODONE HYDROCHLORIDE 30 MG/1
10 TABLET ORAL EVERY 4 HOURS
Refills: 0 | Status: DISCONTINUED | OUTPATIENT
Start: 2025-04-24 | End: 2025-04-25

## 2025-04-24 RX ORDER — OXYCODONE HYDROCHLORIDE 30 MG/1
5 TABLET ORAL EVERY 4 HOURS
Refills: 0 | Status: DISCONTINUED | OUTPATIENT
Start: 2025-04-24 | End: 2025-04-25

## 2025-04-24 RX ORDER — SODIUM CHLORIDE 9 G/1000ML
1000 INJECTION, SOLUTION INTRAVENOUS
Refills: 0 | Status: DISCONTINUED | OUTPATIENT
Start: 2025-04-24 | End: 2025-04-25

## 2025-04-24 RX ORDER — POLYETHYLENE GLYCOL 3350 17 G/17G
17 POWDER, FOR SOLUTION ORAL AT BEDTIME
Refills: 0 | Status: DISCONTINUED | OUTPATIENT
Start: 2025-04-24 | End: 2025-04-25

## 2025-04-24 RX ORDER — ONDANSETRON HCL/PF 4 MG/2 ML
4 VIAL (ML) INJECTION EVERY 6 HOURS
Refills: 0 | Status: DISCONTINUED | OUTPATIENT
Start: 2025-04-24 | End: 2025-04-25

## 2025-04-24 RX ORDER — CELECOXIB 50 MG/1
1 CAPSULE ORAL
Qty: 0 | Refills: 0 | DISCHARGE
Start: 2025-04-24

## 2025-04-24 RX ORDER — ACETAMINOPHEN 500 MG/5ML
1000 LIQUID (ML) ORAL ONCE
Refills: 0 | Status: COMPLETED | OUTPATIENT
Start: 2025-04-24 | End: 2025-04-24

## 2025-04-24 RX ORDER — CELECOXIB 50 MG/1
200 CAPSULE ORAL ONCE
Refills: 0 | Status: COMPLETED | OUTPATIENT
Start: 2025-04-24 | End: 2025-04-24

## 2025-04-24 RX ORDER — SENNA 187 MG
2 TABLET ORAL AT BEDTIME
Refills: 0 | Status: DISCONTINUED | OUTPATIENT
Start: 2025-04-24 | End: 2025-04-25

## 2025-04-24 RX ORDER — APREPITANT 40 MG/1
40 CAPSULE ORAL ONCE
Refills: 0 | Status: COMPLETED | OUTPATIENT
Start: 2025-04-24 | End: 2025-04-24

## 2025-04-24 RX ORDER — MAGNESIUM HYDROXIDE 400 MG/5ML
30 SUSPENSION ORAL DAILY
Refills: 0 | Status: DISCONTINUED | OUTPATIENT
Start: 2025-04-24 | End: 2025-04-25

## 2025-04-24 RX ORDER — OXYCODONE HYDROCHLORIDE 30 MG/1
1 TABLET ORAL
Qty: 0 | Refills: 0 | DISCHARGE
Start: 2025-04-24

## 2025-04-24 RX ORDER — ACETAMINOPHEN 500 MG/5ML
1000 LIQUID (ML) ORAL EVERY 8 HOURS
Refills: 0 | Status: DISCONTINUED | OUTPATIENT
Start: 2025-04-24 | End: 2025-04-25

## 2025-04-24 RX ORDER — LOSARTAN POTASSIUM 100 MG/1
100 TABLET, FILM COATED ORAL DAILY
Refills: 0 | Status: DISCONTINUED | OUTPATIENT
Start: 2025-04-25 | End: 2025-04-25

## 2025-04-24 RX ORDER — CEFAZOLIN SODIUM IN 0.9 % NACL 3 G/100 ML
2000 INTRAVENOUS SOLUTION, PIGGYBACK (ML) INTRAVENOUS EVERY 8 HOURS
Refills: 0 | Status: COMPLETED | OUTPATIENT
Start: 2025-04-24 | End: 2025-04-25

## 2025-04-24 RX ORDER — HYDROMORPHONE/SOD CHLOR,ISO/PF 2 MG/10 ML
0.5 SYRINGE (ML) INJECTION EVERY 4 HOURS
Refills: 0 | Status: DISCONTINUED | OUTPATIENT
Start: 2025-04-24 | End: 2025-04-25

## 2025-04-24 RX ORDER — LOSARTAN POTASSIUM AND HYDROCHLOROTHIAZIDE 12.5; 5 MG/1; MG/1
1 TABLET ORAL
Refills: 0 | DISCHARGE

## 2025-04-24 RX ORDER — ACETAMINOPHEN 500 MG/5ML
2 LIQUID (ML) ORAL
Qty: 0 | Refills: 0 | DISCHARGE
Start: 2025-04-24

## 2025-04-24 RX ORDER — CELECOXIB 50 MG/1
200 CAPSULE ORAL EVERY 12 HOURS
Refills: 0 | Status: DISCONTINUED | OUTPATIENT
Start: 2025-04-25 | End: 2025-04-25

## 2025-04-24 RX ADMIN — Medication 1 APPLICATION(S): at 10:08

## 2025-04-24 RX ADMIN — SODIUM CHLORIDE 120 MILLILITER(S): 9 INJECTION, SOLUTION INTRAVENOUS at 14:10

## 2025-04-24 RX ADMIN — APREPITANT 40 MILLIGRAM(S): 40 CAPSULE ORAL at 10:07

## 2025-04-24 RX ADMIN — Medication 40 MILLIGRAM(S): at 17:11

## 2025-04-24 RX ADMIN — Medication 1000 MILLIGRAM(S): at 10:07

## 2025-04-24 RX ADMIN — Medication 1000 MILLIGRAM(S): at 21:16

## 2025-04-24 RX ADMIN — Medication 1 APPLICATION(S): at 09:57

## 2025-04-24 RX ADMIN — OXYCODONE HYDROCHLORIDE 10 MILLIGRAM(S): 30 TABLET ORAL at 20:19

## 2025-04-24 RX ADMIN — SODIUM CHLORIDE 120 MILLILITER(S): 9 INJECTION, SOLUTION INTRAVENOUS at 17:13

## 2025-04-24 RX ADMIN — KETOROLAC TROMETHAMINE 15 MILLIGRAM(S): 30 INJECTION, SOLUTION INTRAMUSCULAR; INTRAVENOUS at 23:41

## 2025-04-24 RX ADMIN — Medication 2 TABLET(S): at 21:16

## 2025-04-24 RX ADMIN — OXYCODONE HYDROCHLORIDE 10 MILLIGRAM(S): 30 TABLET ORAL at 19:19

## 2025-04-24 RX ADMIN — KETOROLAC TROMETHAMINE 15 MILLIGRAM(S): 30 INJECTION, SOLUTION INTRAMUSCULAR; INTRAVENOUS at 17:11

## 2025-04-24 RX ADMIN — Medication 100 MILLIGRAM(S): at 19:20

## 2025-04-24 RX ADMIN — POLYETHYLENE GLYCOL 3350 17 GRAM(S): 17 POWDER, FOR SOLUTION ORAL at 21:17

## 2025-04-24 RX ADMIN — CELECOXIB 200 MILLIGRAM(S): 50 CAPSULE ORAL at 10:07

## 2025-04-24 NOTE — DISCHARGE NOTE PROVIDER - HOSPITAL COURSE
Admitted to Orthopedic service on 4/24/25  Surgery- Right total knee replacement  Rhina-op Antibiotics  Pain control  DVT prophylaxis- Aspirin 81mg oral twice a day  OOB/Physical Therapy

## 2025-04-24 NOTE — DISCHARGE NOTE PROVIDER - CARE PROVIDER_API CALL
Gary Jensen  Joint Reconstruction  [address]  [place] [address]  Phone: [phone]  Fax: [phone]  Follow Up Time: 2 weeks

## 2025-04-24 NOTE — DISCHARGE NOTE PROVIDER - NSDCMRMEDTOKEN_GEN_ALL_CORE_FT
acetaminophen 500 mg oral tablet: 2 tab(s) orally every 8 hours  aspirin 81 mg oral delayed release tablet: 1 tab(s) orally every 12 hours  celecoxib 200 mg oral capsule: 1 cap(s) orally every 12 hours  losartan-hydroCHLOROthiazide 100 mg-25 mg oral tablet: 1 tab(s) orally once a day  oxyCODONE 5 mg oral tablet: 1 tab(s) orally every 6 hours as needed for  severe pain **TAKE AS PRESCRIBED BY DR MCCONNELL  pantoprazole 40 mg oral delayed release tablet: 1 tab(s) orally once a day (before a meal)

## 2025-04-24 NOTE — DISCHARGE NOTE PROVIDER - NSDCFUSCHEDAPPT_GEN_ALL_CORE_FT
Gary Jensen  [facility] Physician [facility]  ORTHOSURG [address]  Scheduled Appointment: 05/09/2025

## 2025-04-24 NOTE — BRIEF OPERATIVE NOTE - TYPE OF ANESTHESIA
Telephone Encounter by Pema Hu at 01/12/17 11:37 AM     Author:  Pema Hu Service:  (none) Author Type:       Filed:  01/12/17 11:38 AM Encounter Date:  1/11/2017 Status:  Signed     :  Pema Hu ()            Spoke to daughter Yocasta appointment on 1/23 at 11:00 scheduled per work in note.     There is another appointment at 11:00 on 1/23. Please advise if both patients ok at 11:00am, if not will call patient's daughter back to reschedule to different time.[AH1.1M]      Revision History        User Key Date/Time User Provider Type Action    > AH1.1 01/12/17 11:38 AM Pema Hu  Sign    M - Manual             Regional

## 2025-04-24 NOTE — PHYSICAL THERAPY INITIAL EVALUATION ADULT - ADDITIONAL COMMENTS
Pt states she lives with her  in private house with 1 FOS. pt was independent with ADLs and amb PTA denies use of AD

## 2025-04-24 NOTE — PHYSICAL THERAPY INITIAL EVALUATION ADULT - RANGE OF MOTION EXAMINATION, REHAB EVAL
R knee AROM 65 deg/bilateral upper extremity ROM was WFL (within functional limits)/bilateral lower extremity ROM was WFL (within functional limits)

## 2025-04-24 NOTE — PHYSICAL THERAPY INITIAL EVALUATION ADULT - CRITERIA FOR SKILLED THERAPEUTIC INTERVENTIONS
Tetanus shot given by STAT RN   impairments found/functional limitations in following categories/risk reduction/prevention/rehab potential/therapy frequency/anticipated discharge recommendation

## 2025-04-24 NOTE — DISCHARGE NOTE PROVIDER - NSDCFUADDINST_GEN_ALL_CORE_FT
Please follow Dr. Jensen’s instruction sheets *********  ACTIVITY:   - Weight bear as tolerated with assistive device. No strenuous activity, heavy lifting, driving or returning to work until cleared by MD.   - Apply a cold compress to the surgical site several times daily to reduce pain and swelling. For icing, twenty-minute sessions followed by an hour off is recommended. You should ice as frequently as possible. Ice should NEVER be placed directly on the skin. Wearing compression stockings during the first week after surgery can help reduce swelling in your knee, calf and foot, but is not required.      (KNEE REPLACEMENTS)   DO place a pillow under your heel when elevating the leg, to encourage full extension of the knee. Do NOT place a pillow behind your knee for comfort, as this can lead to permanent difficulty straightening your leg. It is normal to develop some swelling in the leg, ankle, and foot because of gravity.       DRESSING/SHOWERING:   (AQUACEL – brown gel dressing)   - You may shower, your dressing is water-resistant. Do not soak in bathtubs. Remove dressing after postop day 7, then leave incision open to air. You may do this yourself (simply peel it off), or you may ask for assistance from your visiting nurse. Beneath your aquacel dressing, you have a sylke dressing.     Sylke (white mesh appearance dressing)   - Do not remove this dressing it is to remain until your first post op visit. If dressing falls off prior to visit call the office for further instruction.    - You may shower post op day 1 at night, your dressing is water-resistant. Do not soak in bathtubs.  - Do not pick at your incision/ bandage. Do not apply creams, ointments or oils to your incision/ surrounding area of the bandage until cleared by your surgeon. Do not soak your incision in sitting water (ie tubs, pools, lakes, etc.) until cleared by your surgeon. You may let clean, running water fall over your bandage.      MEDICATION/ANTICOAGULATION:   -You have been prescribed Aspirin 81mg oral twice a day, as a preventative to help prevent postoperative blood clots. Please take this medication as prescribed.    - You have been prescribed medications for pain:     - Tylenol for mild to moderate pain. Do not exceed 3,000mg daily.     - For more severe pain, take Tylenol with the addition of narcotic pain medication. Take this medication as prescribed. This medication may cause drowsiness or dizziness. Do not operate machinery. This medication may cause constipation.   - For any additional medications, follow instructions on the bottle.    -Try to have regular bowel movements. Take stool softener or laxative if necessary. You may wish to take Miralax daily until you have regular bowel movements.    - If you have been prescribed Aspirin or an anti-inflammatory, please take prilosec (omeprazole) once a day, before breakfast, until no longer taking Aspirin or anti-inflammatory. This will help protect your stomach.   - If you have a pain management physician, please follow-up with them postoperatively.    - If you experience any negative side effects of your medications, please call your surgeon's office to discuss.      FOLLOW-UP:   - Call to schedule an appt with Dr. Jensen for follow up.   - Please follow-up with your primary care physician or any other specialist you see postoperatively, if needed.    - Contact your doctor or go to the emergency room if you experience: fever greater than 101.5, chills, chest pain, difficulty breathing, redness or excessive drainage around the incision, other concerns.   Please follow Dr. Jensen’s instruction sheets as your primary source of discharge information. *********  ACTIVITY:   - Weight bear as tolerated with assistive device. No strenuous activity, heavy lifting, driving or returning to work until cleared by MD.   - Apply a cold compress to the surgical site several times daily to reduce pain and swelling. For icing, twenty-minute sessions followed by an hour off is recommended. You should ice as frequently as possible. Ice should NEVER be placed directly on the skin. Wearing compression stockings during the first week after surgery can help reduce swelling in your knee, calf and foot, but is not required.      (KNEE REPLACEMENTS)   DO place a pillow under your heel when elevating the leg, to encourage full extension of the knee. Do NOT place a pillow behind your knee for comfort, as this can lead to permanent difficulty straightening your leg. It is normal to develop some swelling in the leg, ankle, and foot because of gravity.       DRESSING/SHOWERING:   (AQUACEL – brown gel dressing)   - You may shower, your dressing is water-resistant. Do not soak in bathtubs. Remove dressing after postop day 7, then leave incision open to air. You may do this yourself (simply peel it off), or you may ask for assistance from your visiting nurse. Beneath your aquacel dressing, you have a sylke dressing.     Sylke (white mesh appearance dressing)   - Do not remove this dressing it is to remain until your first post op visit. If dressing falls off prior to visit call the office for further instruction.    - You may shower post op day 1 at night, your dressing is water-resistant. Do not soak in bathtubs.  - Do not pick at your incision/ bandage. Do not apply creams, ointments or oils to your incision/ surrounding area of the bandage until cleared by your surgeon. Do not soak your incision in sitting water (ie tubs, pools, lakes, etc.) until cleared by your surgeon. You may let clean, running water fall over your bandage.      MEDICATION/ANTICOAGULATION:   -You have been prescribed Aspirin 81mg oral twice a day, as a preventative to help prevent postoperative blood clots. Please take this medication as prescribed.    - You have been prescribed medications for pain:     - Tylenol for mild to moderate pain. Do not exceed 3,000mg daily.     - For more severe pain, take Tylenol with the addition of narcotic pain medication. Take this medication as prescribed. This medication may cause drowsiness or dizziness. Do not operate machinery. This medication may cause constipation.   - For any additional medications, follow instructions on the bottle.    -Try to have regular bowel movements. Take stool softener or laxative if necessary. You may wish to take Miralax daily until you have regular bowel movements.    - If you have been prescribed Aspirin or an anti-inflammatory, please take prilosec (omeprazole) once a day, before breakfast, until no longer taking Aspirin or anti-inflammatory. This will help protect your stomach.   - If you have a pain management physician, please follow-up with them postoperatively.    - If you experience any negative side effects of your medications, please call your surgeon's office to discuss.      FOLLOW-UP:   - Call to schedule an appt with Dr. Jensen for follow up.   - Please follow-up with your primary care physician or any other specialist you see postoperatively, if needed.    - Contact your doctor or go to the emergency room if you experience: fever greater than 101.5, chills, chest pain, difficulty breathing, redness or excessive drainage around the incision, other concerns.

## 2025-04-24 NOTE — PHYSICAL THERAPY INITIAL EVALUATION ADULT - GENERAL OBSERVATIONS, REHAB EVAL
Pt received semi supine in bed +hep lock +SCD +dressing on R knee MICHAEL. Pt amb 20 ft x2 with RW CGA. Pt was left as received with call bell in reach, VSS, and in NAD

## 2025-04-24 NOTE — PACU DISCHARGE NOTE - PAIN:
Controlled with current regime [Improved Health] : Improved health [Improved Looks/Aesthetics] : Improved looks/aesthetics [Young Adult] : yound adult [Cut/Track Calories] : cut/track calories [Commerial Program: _____] : commercial program: [unfilled] [Poor eating habits] : poor eating habits [Time management] : time management [Work stress] : work stress [7] : 7 [I usually sleep 8 or more hours] : I usually sleep 8 or more hours [I snore] : I snore [2+ miles] : Walking distance capability: 2+ miles [Walking] : walking [0] : 0 [None] : none [] : No [FreeTextEntry1] : 45 year old female with HLD presents for evaluation of weight gain. She reports that she has gained weight since having her son. She reports she weighed 225 lbs at delivery in 2013 and went down to 190 lbs, she was able to maintain this weight until 2021. She has tried multiple diets but is unable to keep the weight off. About 2 years ago she also tried Noom and lost 15 lbs. She is frustrated that despite following a healthy diet and exercising she is unable to lose weight. She does drink wine in the evenings and on occasion will have some ice cream. She has developed neck pain as well. \par She suffers from anxiety but does not take any medication for this. \par Pt c/o drastic weight fluctuations even overnight.\par There is a strong fhx of T2DM.

## 2025-04-24 NOTE — PHYSICAL THERAPY INITIAL EVALUATION ADULT - PERTINENT HX OF CURRENT PROBLEM, REHAB EVAL
56yo F c/o right knee pain x many years. Pt notes she had a knee arthroscopy about 4 years ago that helped her pain for some time, but now it has progressively increased. Pain persists despite conservative measures. She does not take anything for pain at home. She ambulates with no assistance. She denies numbness, tingling, paresthesias to BLE.   Presents today for elective RIGHT TKR.   Has the patient used any narcotic more than 90 days prior to the date of surgery? No- pt denies taking anything for pain.

## 2025-04-25 ENCOUNTER — TRANSCRIPTION ENCOUNTER (OUTPATIENT)
Age: 55
End: 2025-04-25

## 2025-04-25 VITALS
SYSTOLIC BLOOD PRESSURE: 124 MMHG | HEART RATE: 52 BPM | DIASTOLIC BLOOD PRESSURE: 78 MMHG | TEMPERATURE: 98 F | RESPIRATION RATE: 17 BRPM | OXYGEN SATURATION: 96 %

## 2025-04-25 LAB
ANION GAP SERPL CALC-SCNC: 12 MMOL/L — SIGNIFICANT CHANGE UP (ref 5–17)
BUN SERPL-MCNC: 16 MG/DL — SIGNIFICANT CHANGE UP (ref 7–23)
CALCIUM SERPL-MCNC: 9.9 MG/DL — SIGNIFICANT CHANGE UP (ref 8.4–10.5)
CHLORIDE SERPL-SCNC: 103 MMOL/L — SIGNIFICANT CHANGE UP (ref 96–108)
CO2 SERPL-SCNC: 22 MMOL/L — SIGNIFICANT CHANGE UP (ref 22–31)
CREAT SERPL-MCNC: 0.73 MG/DL — SIGNIFICANT CHANGE UP (ref 0.5–1.3)
EGFR: 98 ML/MIN/1.73M2 — SIGNIFICANT CHANGE UP
EGFR: 98 ML/MIN/1.73M2 — SIGNIFICANT CHANGE UP
GLUCOSE SERPL-MCNC: 126 MG/DL — HIGH (ref 70–99)
HCT VFR BLD CALC: 34.8 % — SIGNIFICANT CHANGE UP (ref 34.5–45)
HGB BLD-MCNC: 11.4 G/DL — LOW (ref 11.5–15.5)
MCHC RBC-ENTMCNC: 27.9 PG — SIGNIFICANT CHANGE UP (ref 27–34)
MCHC RBC-ENTMCNC: 32.8 G/DL — SIGNIFICANT CHANGE UP (ref 32–36)
MCV RBC AUTO: 85.3 FL — SIGNIFICANT CHANGE UP (ref 80–100)
NRBC BLD AUTO-RTO: 0 /100 WBCS — SIGNIFICANT CHANGE UP (ref 0–0)
PLATELET # BLD AUTO: 299 K/UL — SIGNIFICANT CHANGE UP (ref 150–400)
POTASSIUM SERPL-MCNC: 4.9 MMOL/L — SIGNIFICANT CHANGE UP (ref 3.5–5.3)
POTASSIUM SERPL-SCNC: 4.9 MMOL/L — SIGNIFICANT CHANGE UP (ref 3.5–5.3)
RBC # BLD: 4.08 M/UL — SIGNIFICANT CHANGE UP (ref 3.8–5.2)
RBC # FLD: 14.3 % — SIGNIFICANT CHANGE UP (ref 10.3–14.5)
SODIUM SERPL-SCNC: 137 MMOL/L — SIGNIFICANT CHANGE UP (ref 135–145)
WBC # BLD: 19.36 K/UL — HIGH (ref 3.8–10.5)
WBC # FLD AUTO: 19.36 K/UL — HIGH (ref 3.8–10.5)

## 2025-04-25 PROCEDURE — C1776: CPT

## 2025-04-25 PROCEDURE — 97162 PT EVAL MOD COMPLEX 30 MIN: CPT

## 2025-04-25 PROCEDURE — C1713: CPT

## 2025-04-25 PROCEDURE — 73560 X-RAY EXAM OF KNEE 1 OR 2: CPT

## 2025-04-25 PROCEDURE — 97116 GAIT TRAINING THERAPY: CPT

## 2025-04-25 PROCEDURE — 85027 COMPLETE CBC AUTOMATED: CPT

## 2025-04-25 PROCEDURE — 80048 BASIC METABOLIC PNL TOTAL CA: CPT

## 2025-04-25 PROCEDURE — 97110 THERAPEUTIC EXERCISES: CPT

## 2025-04-25 PROCEDURE — 99222 1ST HOSP IP/OBS MODERATE 55: CPT

## 2025-04-25 PROCEDURE — 36415 COLL VENOUS BLD VENIPUNCTURE: CPT

## 2025-04-25 RX ADMIN — OXYCODONE HYDROCHLORIDE 10 MILLIGRAM(S): 30 TABLET ORAL at 10:44

## 2025-04-25 RX ADMIN — OXYCODONE HYDROCHLORIDE 10 MILLIGRAM(S): 30 TABLET ORAL at 14:34

## 2025-04-25 RX ADMIN — Medication 100 MILLIGRAM(S): at 03:43

## 2025-04-25 RX ADMIN — Medication 40 MILLIGRAM(S): at 05:31

## 2025-04-25 RX ADMIN — OXYCODONE HYDROCHLORIDE 10 MILLIGRAM(S): 30 TABLET ORAL at 15:34

## 2025-04-25 RX ADMIN — Medication 1000 MILLIGRAM(S): at 06:16

## 2025-04-25 RX ADMIN — Medication 1000 MILLIGRAM(S): at 13:10

## 2025-04-25 RX ADMIN — KETOROLAC TROMETHAMINE 15 MILLIGRAM(S): 30 INJECTION, SOLUTION INTRAMUSCULAR; INTRAVENOUS at 07:11

## 2025-04-25 RX ADMIN — Medication 1000 MILLIGRAM(S): at 07:11

## 2025-04-25 RX ADMIN — KETOROLAC TROMETHAMINE 15 MILLIGRAM(S): 30 INJECTION, SOLUTION INTRAMUSCULAR; INTRAVENOUS at 06:16

## 2025-04-25 RX ADMIN — KETOROLAC TROMETHAMINE 15 MILLIGRAM(S): 30 INJECTION, SOLUTION INTRAMUSCULAR; INTRAVENOUS at 05:30

## 2025-04-25 RX ADMIN — Medication 1000 MILLIGRAM(S): at 05:31

## 2025-04-25 RX ADMIN — OXYCODONE HYDROCHLORIDE 10 MILLIGRAM(S): 30 TABLET ORAL at 09:44

## 2025-04-25 RX ADMIN — KETOROLAC TROMETHAMINE 15 MILLIGRAM(S): 30 INJECTION, SOLUTION INTRAMUSCULAR; INTRAVENOUS at 13:44

## 2025-04-25 RX ADMIN — Medication 81 MILLIGRAM(S): at 09:44

## 2025-04-25 RX ADMIN — KETOROLAC TROMETHAMINE 15 MILLIGRAM(S): 30 INJECTION, SOLUTION INTRAMUSCULAR; INTRAVENOUS at 13:14

## 2025-04-25 NOTE — CONSULT NOTE ADULT - ASSESSMENT
55F w HTN, GERD, here w chronic R knee pain - elective R TKR w Dr. Jensen 4/24    #Post-op state  #R Knee OA   - On tylenol 1g q8, celebrex 200 BID   - PRN: Oxycodone 5/10 q4 for mod/severe pain      #Leukocytosis - WBC 19.   #Acute blood loss anemia - hgb 11.4 baseline _11.8.    A1c 5.4    #HTN - Losartan - hctz 100/25 daily at home  #GERD - PPI daily  #Obesity - BMI 33. Affects all aspects of care    Plan  Can restart HCTZ 25mg if SBP consistently > 130  HPT 55F w HTN, GERD, here w chronic R knee pain - elective R TKR w Dr. Jensen 4/24    #Post-op state - pain controlled. PPx: ASA 81 BID. On bowel regimen and incentive spirometer  #R Knee OA   - On tylenol 1g q8, celebrex 200 BID   - PRN: Oxycodone 5/10 q4 for mod/severe pain    #Leukocytosis - WBC 19. Likely reactive. Afebrile and non-toxic appearing  A1c 5.4    #HTN - Losartan - hctz 100/25 daily at home  #GERD - PPI daily  #Obesity - BMI 33. Affects all aspects of care    Plan  Can resume home BP medication  Overall - optimized for DC  HPT    Above d/w Ortho PA Raghu

## 2025-04-25 NOTE — PROGRESS NOTE ADULT - SUBJECTIVE AND OBJECTIVE BOX
Ortho Post Op Check    Procedure:  Right TKA   Surgeon: Dr. Jensen     Patient seen and examined at bedside in the PACU   Pt comfortable without complaints, pain controlled  Denies CP, SOB, N/V, numbness/tingling     Vital Signs Last 24 Hrs  T(C): 36.1 (04-24-25 @ 14:08), Max: 36.1 (04-24-25 @ 14:08)  T(F): 97 (04-24-25 @ 14:08), Max: 97 (04-24-25 @ 14:08)  HR: 54 (04-24-25 @ 15:38) (46 - 66)  BP: 121/70 (04-24-25 @ 15:38) (110/65 - 129/75)  BP(mean): 91 (04-24-25 @ 15:38) (83 - 97)  RR: 12 (04-24-25 @ 15:38) (12 - 19)  SpO2: 100% (04-24-25 @ 15:38) (98% - 100%)  AVSS    General: Pt Alert and oriented, NAD  Dressing C/D/I: Aquacel Right knee   Pulses: 2+ DP pulses bilateral LE   Sensation: intact to light touch right lower extremity   Motor: EHL/FHL/TA/GS 5/5 bilateral LE         Post-op X-Ray: R TKA prosthesis in anatomic alignment     A/P: 55yFemale POD#0 s/p Right TKA    - Stable in PACU  - Pain Control prn  - DVT ppx: start ASA 81mg BID POD #1  - Post op abx: ancef  - PT, WBS: WBAT  - Admit to regional     Ortho Pager [phone]
    Procedure:  Right TKA   Surgeon: Dr. Jensen       Pt comfortable without complaints, pain controlled  Denies CP, SOB, N/V, numbness/tingling     Vital Signs Last 24 Hrs  T(C): 36.1 (04-24-25 @ 14:08), Max: 36.1 (04-24-25 @ 14:08)  T(F): 97 (04-24-25 @ 14:08), Max: 97 (04-24-25 @ 14:08)  HR: 54 (04-24-25 @ 15:38) (46 - 66)  BP: 121/70 (04-24-25 @ 15:38) (110/65 - 129/75)  BP(mean): 91 (04-24-25 @ 15:38) (83 - 97)  RR: 12 (04-24-25 @ 15:38) (12 - 19)  SpO2: 100% (04-24-25 @ 15:38) (98% - 100%)  AVSS    General: Pt Alert and oriented, NAD  Dressing C/D/I: Aquacel Right knee   Pulses: 2+ DP pulses bilateral LE   Sensation: intact to light touch right lower extremity   Motor: EHL/FHL/TA/GS 5/5 bilateral LE         Post-op X-Ray: R TKA prosthesis in anatomic alignment     A/P: 55yFemale POD#1 s/p Right TKA    - Stable in PACU  - Pain Control prn  - DVT ppx: start ASA 81mg BID POD #1  - Post op abx: ancef  - PT, WBS: WBAT  - PT: HPT     Ortho Pager [phone]
Ortho Note    Pt seen during morning rounds and lying comfortably without complaints, pain controlled while lying in bed.  Pt is tolerating her diet well, passing flatus, voiding freely. Pt is looking forward to working with PT today so that she can go home.  Denies CP, SOB, N/V, numbness/tingling     Vital Signs Last 24 Hrs  T(C): 36.5 (04-25-25 @ 09:00), Max: 36.5 (04-25-25 @ 09:00)  T(F): 97.7 (04-25-25 @ 09:00), Max: 97.7 (04-25-25 @ 09:00)  HR: 52 (04-25-25 @ 09:00) (52 - 57)  BP: 124/78 (04-25-25 @ 09:00) (113/74 - 124/78)  BP(mean): 94 (04-25-25 @ 09:00) (94 - 94)  RR: 17 (04-25-25 @ 09:00) (17 - 17)  SpO2: 96% (04-25-25 @ 09:00) (95% - 96%)  I&O's Summary    24 Apr 2025 07:01  -  25 Apr 2025 07:00  --------------------------------------------------------  IN: 360 mL / OUT: 700 mL / NET: -340 mL        General: Pt Alert and oriented, NAD  DSG C/D/I - Aquacel  Pulses: 2+ DP. Skin warm, dry, well perfused b/l  Sensation: SILT L3-S1 b/l  Motor: EHL/FHL/TA/GS 5/5, quads firing b/l                          11.4   19.36 )-----------( 299      ( 25 Apr 2025 08:26 )             34.8             A/P: 55yFemale POD# s/p R total knee replacement with Dr. Mikey Carranza. Continue to appreciate medicine comanagement  - F/u AM labs  - Pain Control  - DVT ppx: ASA, SCDs  - PT, WBS: WBAT  - PPI, Bowel regimen, IS  - OOB for meals  - Dispo: HPT pending clearance    Ortho Pager [phone]

## 2025-04-25 NOTE — CONSULT NOTE ADULT - SUBJECTIVE AND OBJECTIVE BOX
HPI:  54yo F c/o right knee pain x many years. Pt notes she had a knee arthroscopy about 4 years ago that helped her pain for some time, but now it has progressively increased. Pain persists despite conservative measures. She does not take anything for pain at home. She ambulates with no assistance. She denies numbness, tingling, paresthesias to BLE.   Presents today for elective RIGHT TKR.   Has the patient used any narcotic more than 90 days prior to the date of surgery? No- pt denies taking anything for pain.  (22 Apr 2025 15:21)    55F w HTN, GERD, here w chronic R knee pain - elective R TKR w Dr. Mcconnell 4/24    #Post-op state  #R Knee OA   - On tylenol 1g q8, celebrex 200 BID   - PRN: Oxycodone 5/10 q4 for mod/severe pain      #Leukocytosis - WBC 19.   #Acute blood loss anemia - hgb 11.4 baseline _11.8.    A1c 5.4    #HTN - Losartan - hctz 100/25 daily at home  #GERD - PPI daily  #Obesity - BMI 33. Affects all aspects of care    Plan  Can restart HCTZ 25mg if SBP consistently > 130  HPT      PAST MEDICAL & SURGICAL HISTORY:  HTN (hypertension)      H/O shoulder surgery  B/L      H/O meniscectomy of right knee      H/O tubal ligation  1998      H/O total cystectomy  abdomen      Fibroids        Home Meds: Home Medications:  acetaminophen 500 mg oral tablet: 2 tab(s) orally every 8 hours (24 Apr 2025 14:23)  aspirin 81 mg oral delayed release tablet: 1 tab(s) orally every 12 hours (24 Apr 2025 14:23)  celecoxib 200 mg oral capsule: 1 cap(s) orally every 12 hours (24 Apr 2025 14:23)  losartan-hydroCHLOROthiazide 100 mg-25 mg oral tablet: 1 tab(s) orally once a day (24 Apr 2025 09:47)  oxyCODONE 5 mg oral tablet: 1 tab(s) orally every 6 hours as needed for  severe pain **TAKE AS PRESCRIBED BY DR MCCONNELL (24 Apr 2025 14:23)  pantoprazole 40 mg oral delayed release tablet: 1 tab(s) orally once a day (before a meal) (24 Apr 2025 14:23)    Allergies: Allergies    No Known Allergies    Intolerances      Soc:   Advanced Directives: Presumed Full Code     CURRENT MEDICATIONS:   --------------------------------------------------------------------------------------  Neurologic Medications  acetaminophen     Tablet .. 1000 milliGRAM(s) Oral every 8 hours  celecoxib 200 milliGRAM(s) Oral every 12 hours  HYDROmorphone  Injectable 0.5 milliGRAM(s) IV Push every 15 minutes PRN breakthrough pain  HYDROmorphone  Injectable 0.5 milliGRAM(s) IV Push every 4 hours PRN Breakthrough pain  ketorolac   Injectable 15 milliGRAM(s) IV Push every 6 hours  ondansetron Injectable 4 milliGRAM(s) IV Push every 6 hours PRN Nausea and/or Vomiting  oxyCODONE    IR 5 milliGRAM(s) Oral every 4 hours PRN Moderate Pain (4 - 6)  oxyCODONE    IR 10 milliGRAM(s) Oral every 4 hours PRN Severe Pain (7 - 10)    Respiratory Medications    Cardiovascular Medications  losartan 100 milliGRAM(s) Oral daily    Gastrointestinal Medications  lactated ringers. 1000 milliLiter(s) IV Continuous <Continuous>  magnesium hydroxide Suspension 30 milliLiter(s) Oral daily PRN Constipation  pantoprazole    Tablet 40 milliGRAM(s) Oral before breakfast  polyethylene glycol 3350 17 Gram(s) Oral at bedtime  senna 2 Tablet(s) Oral at bedtime    Genitourinary Medications    Hematologic/Oncologic Medications  aspirin enteric coated 81 milliGRAM(s) Oral every 12 hours    Antimicrobial/Immunologic Medications    Endocrine/Metabolic Medications    Topical/Other Medications  Tranexamic acid 1 Gram(s) 1 Gram(s) IntraArticular once    --------------------------------------------------------------------------------------    VITAL SIGNS, INS/OUTS (last 24 hours):  --------------------------------------------------------------------------------------  ICU Vital Signs Last 24 Hrs  T(C): 36.5 (25 Apr 2025 09:00), Max: 36.6 (24 Apr 2025 16:14)  T(F): 97.7 (25 Apr 2025 09:00), Max: 97.9 (24 Apr 2025 16:14)  HR: 52 (25 Apr 2025 09:00) (46 - 74)  BP: 124/78 (25 Apr 2025 09:00) (103/67 - 129/75)  BP(mean): 94 (25 Apr 2025 09:00) (83 - 97)  ABP: --  ABP(mean): --  RR: 17 (25 Apr 2025 09:00) (12 - 19)  SpO2: 96% (25 Apr 2025 09:00) (95% - 100%)    O2 Parameters below as of 25 Apr 2025 09:00  Patient On (Oxygen Delivery Method): room air          I&O's Summary    24 Apr 2025 07:01  -  25 Apr 2025 07:00  --------------------------------------------------------  IN: 360 mL / OUT: 700 mL / NET: -340 mL      --------------------------------------------------------------------------------------    EXAM:      LABS  --------------------------------------------------------------------------------------  Labs:  CAPILLARY BLOOD GLUCOSE                              11.4   19.36 )-----------( 299      ( 25 Apr 2025 08:26 )             34.8         04-25    137  |  103  |  16  ----------------------------<  126[H]  4.9   |  22  |  0.73      Calcium: 9.9 mg/dL (04-25-25 @ 08:26)      LFTs:         Coags:            Urinalysis Basic - ( 25 Apr 2025 08:26 )    Color: x / Appearance: x / SG: x / pH: x  Gluc: 126 mg/dL / Ketone: x  / Bili: x / Urobili: x   Blood: x / Protein: x / Nitrite: x   Leuk Esterase: x / RBC: x / WBC x   Sq Epi: x / Non Sq Epi: x / Bacteria: x          --------------------------------------------------------------------------------------    OTHER LABS    IMAGING RESULTS  ****************       HPI:  56yo F c/o right knee pain x many years. Pt notes she had a knee arthroscopy about 4 years ago that helped her pain for some time, but now it has progressively increased. Pain persists despite conservative measures. She does not take anything for pain at home. She ambulates with no assistance. She denies numbness, tingling, paresthesias to BLE.   Presents today for elective RIGHT TKR.   Has the patient used any narcotic more than 90 days prior to the date of surgery? No- pt denies taking anything for pain.  (22 Apr 2025 15:21)    55F w HTN, GERD, here w chronic R knee pain - elective R TKR w Dr. Mcconnell 4/24    Pt resting - states pain is controlled. No LH/dizziness, chest pain, dyspnea. Eating wo N/V/abd pain. +Flatus wo BM.  Eager to return home.    ROS: 12 point ROS reviewed and otherwise negative per HPI  FH: No DVT/PE   SH: Non smoker      PAST MEDICAL & SURGICAL HISTORY:  HTN (hypertension)      H/O shoulder surgery  B/L      H/O meniscectomy of right knee      H/O tubal ligation  1998      H/O total cystectomy  abdomen      Fibroids        Home Meds: Home Medications:  acetaminophen 500 mg oral tablet: 2 tab(s) orally every 8 hours (24 Apr 2025 14:23)  aspirin 81 mg oral delayed release tablet: 1 tab(s) orally every 12 hours (24 Apr 2025 14:23)  celecoxib 200 mg oral capsule: 1 cap(s) orally every 12 hours (24 Apr 2025 14:23)  losartan-hydroCHLOROthiazide 100 mg-25 mg oral tablet: 1 tab(s) orally once a day (24 Apr 2025 09:47)  oxyCODONE 5 mg oral tablet: 1 tab(s) orally every 6 hours as needed for  severe pain **TAKE AS PRESCRIBED BY DR MCCONNELL (24 Apr 2025 14:23)  pantoprazole 40 mg oral delayed release tablet: 1 tab(s) orally once a day (before a meal) (24 Apr 2025 14:23)    Allergies: Allergies    No Known Allergies    Intolerances      Soc:   Advanced Directives: Presumed Full Code     CURRENT MEDICATIONS:   --------------------------------------------------------------------------------------  Neurologic Medications  acetaminophen     Tablet .. 1000 milliGRAM(s) Oral every 8 hours  celecoxib 200 milliGRAM(s) Oral every 12 hours  HYDROmorphone  Injectable 0.5 milliGRAM(s) IV Push every 15 minutes PRN breakthrough pain  HYDROmorphone  Injectable 0.5 milliGRAM(s) IV Push every 4 hours PRN Breakthrough pain  ketorolac   Injectable 15 milliGRAM(s) IV Push every 6 hours  ondansetron Injectable 4 milliGRAM(s) IV Push every 6 hours PRN Nausea and/or Vomiting  oxyCODONE    IR 5 milliGRAM(s) Oral every 4 hours PRN Moderate Pain (4 - 6)  oxyCODONE    IR 10 milliGRAM(s) Oral every 4 hours PRN Severe Pain (7 - 10)    Respiratory Medications    Cardiovascular Medications  losartan 100 milliGRAM(s) Oral daily    Gastrointestinal Medications  lactated ringers. 1000 milliLiter(s) IV Continuous <Continuous>  magnesium hydroxide Suspension 30 milliLiter(s) Oral daily PRN Constipation  pantoprazole    Tablet 40 milliGRAM(s) Oral before breakfast  polyethylene glycol 3350 17 Gram(s) Oral at bedtime  senna 2 Tablet(s) Oral at bedtime    Genitourinary Medications    Hematologic/Oncologic Medications  aspirin enteric coated 81 milliGRAM(s) Oral every 12 hours    Antimicrobial/Immunologic Medications    Endocrine/Metabolic Medications    Topical/Other Medications  Tranexamic acid 1 Gram(s) 1 Gram(s) IntraArticular once    --------------------------------------------------------------------------------------    VITAL SIGNS, INS/OUTS (last 24 hours):  --------------------------------------------------------------------------------------  ICU Vital Signs Last 24 Hrs  T(C): 36.5 (25 Apr 2025 09:00), Max: 36.6 (24 Apr 2025 16:14)  T(F): 97.7 (25 Apr 2025 09:00), Max: 97.9 (24 Apr 2025 16:14)  HR: 52 (25 Apr 2025 09:00) (46 - 74)  BP: 124/78 (25 Apr 2025 09:00) (103/67 - 129/75)  BP(mean): 94 (25 Apr 2025 09:00) (83 - 97)  ABP: --  ABP(mean): --  RR: 17 (25 Apr 2025 09:00) (12 - 19)  SpO2: 96% (25 Apr 2025 09:00) (95% - 100%)    O2 Parameters below as of 25 Apr 2025 09:00  Patient On (Oxygen Delivery Method): room air          I&O's Summary    24 Apr 2025 07:01  -  25 Apr 2025 07:00  --------------------------------------------------------  IN: 360 mL / OUT: 700 mL / NET: -340 mL      --------------------------------------------------------------------------------------    EXAM:  GEN: female in NAD on RA  HEENT: NC/AT, MMM  CV: RRR, nml S1S2, no murmurs  PULM: nml effort, CTAB  ABD: Soft, non-distended, NABS, non-tender  NEURO  A/O x3, moving all extremities, Sensation intact  R knee dressing c/d/i. 5/5 in plantarflex/ext b/l  PSYCH: Appropriate      LABS  --------------------------------------------------------------------------------------  Labs:  CAPILLARY BLOOD GLUCOSE                              11.4   19.36 )-----------( 299      ( 25 Apr 2025 08:26 )             34.8         04-25    137  |  103  |  16  ----------------------------<  126[H]  4.9   |  22  |  0.73      Calcium: 9.9 mg/dL (04-25-25 @ 08:26)      LFTs:         Coags:            Urinalysis Basic - ( 25 Apr 2025 08:26 )    Color: x / Appearance: x / SG: x / pH: x  Gluc: 126 mg/dL / Ketone: x  / Bili: x / Urobili: x   Blood: x / Protein: x / Nitrite: x   Leuk Esterase: x / RBC: x / WBC x   Sq Epi: x / Non Sq Epi: x / Bacteria: x          --------------------------------------------------------------------------------------    OTHER LABS    IMAGING RESULTS  ****************

## 2025-04-25 NOTE — DISCHARGE NOTE NURSING/CASE MANAGEMENT/SOCIAL WORK - PATIENT PORTAL LINK FT
You can access the FollowMyHealth Patient Portal offered by [facility] by registering at the following website: [email or web address]. By joining [facility]’s FollowMyHealth portal, you will also be able to view your health information using other applications (apps) compatible with our system.

## 2025-04-25 NOTE — DISCHARGE NOTE NURSING/CASE MANAGEMENT/SOCIAL WORK - FINANCIAL ASSISTANCE
[facility] provides services at a reduced cost to those who are determined to be eligible through [facility]’s financial assistance program. Information regarding [facility]’s financial assistance program can be found by going to [email or web address] or by calling [phone].

## 2025-04-29 DIAGNOSIS — G89.29 OTHER CHRONIC PAIN: ICD-10-CM

## 2025-04-29 DIAGNOSIS — M17.11 UNILATERAL PRIMARY OSTEOARTHRITIS, RIGHT KNEE: ICD-10-CM

## 2025-04-29 DIAGNOSIS — D21.9 BENIGN NEOPLASM OF CONNECTIVE AND OTHER SOFT TISSUE, UNSPECIFIED: ICD-10-CM

## 2025-04-29 DIAGNOSIS — I10 ESSENTIAL (PRIMARY) HYPERTENSION: ICD-10-CM

## 2025-04-29 DIAGNOSIS — M25.561 PAIN IN RIGHT KNEE: ICD-10-CM

## 2025-04-29 DIAGNOSIS — Z79.1 LONG TERM (CURRENT) USE OF NON-STEROIDAL ANTI-INFLAMMATORIES (NSAID): ICD-10-CM

## 2025-04-29 DIAGNOSIS — Z90.6 ACQUIRED ABSENCE OF OTHER PARTS OF URINARY TRACT: ICD-10-CM

## 2025-04-29 DIAGNOSIS — E66.9 OBESITY, UNSPECIFIED: ICD-10-CM

## 2025-04-29 DIAGNOSIS — D72.829 ELEVATED WHITE BLOOD CELL COUNT, UNSPECIFIED: ICD-10-CM

## 2025-04-29 DIAGNOSIS — Z79.891 LONG TERM (CURRENT) USE OF OPIATE ANALGESIC: ICD-10-CM

## 2025-05-01 ENCOUNTER — NON-APPOINTMENT (OUTPATIENT)
Age: 55
End: 2025-05-01

## 2025-05-09 ENCOUNTER — APPOINTMENT (OUTPATIENT)
Dept: ORTHOPEDIC SURGERY | Facility: CLINIC | Age: 55
End: 2025-05-09
Payer: COMMERCIAL

## 2025-05-09 VITALS
WEIGHT: 197 LBS | OXYGEN SATURATION: 99 % | HEIGHT: 64 IN | TEMPERATURE: 98 F | BODY MASS INDEX: 33.63 KG/M2 | DIASTOLIC BLOOD PRESSURE: 85 MMHG | HEART RATE: 76 BPM | SYSTOLIC BLOOD PRESSURE: 121 MMHG

## 2025-05-09 DIAGNOSIS — Z96.651 AFTERCARE FOLLOWING JOINT REPLACEMENT SURGERY: ICD-10-CM

## 2025-05-09 DIAGNOSIS — Z47.1 AFTERCARE FOLLOWING JOINT REPLACEMENT SURGERY: ICD-10-CM

## 2025-05-09 PROCEDURE — 99024 POSTOP FOLLOW-UP VISIT: CPT

## 2025-05-09 RX ORDER — GABAPENTIN 300 MG/1
300 CAPSULE ORAL
Qty: 30 | Refills: 2 | Status: ACTIVE | COMMUNITY
Start: 2025-05-09 | End: 1900-01-01

## 2025-05-09 RX ORDER — TRAMADOL HYDROCHLORIDE 50 MG/1
50 TABLET, COATED ORAL
Qty: 40 | Refills: 0 | Status: ACTIVE | COMMUNITY
Start: 2025-05-09 | End: 1900-01-01

## 2025-05-29 ENCOUNTER — NON-APPOINTMENT (OUTPATIENT)
Age: 55
End: 2025-05-29

## 2025-06-11 ENCOUNTER — RESULT REVIEW (OUTPATIENT)
Age: 55
End: 2025-06-11

## 2025-06-11 ENCOUNTER — APPOINTMENT (OUTPATIENT)
Dept: ORTHOPEDIC SURGERY | Facility: CLINIC | Age: 55
End: 2025-06-11
Payer: COMMERCIAL

## 2025-06-11 ENCOUNTER — OUTPATIENT (OUTPATIENT)
Dept: OUTPATIENT SERVICES | Facility: HOSPITAL | Age: 55
LOS: 1 days | End: 2025-06-11
Payer: COMMERCIAL

## 2025-06-11 VITALS
HEIGHT: 64 IN | WEIGHT: 197 LBS | BODY MASS INDEX: 33.63 KG/M2 | DIASTOLIC BLOOD PRESSURE: 78 MMHG | SYSTOLIC BLOOD PRESSURE: 114 MMHG | HEART RATE: 68 BPM | OXYGEN SATURATION: 98 % | TEMPERATURE: 97.9 F

## 2025-06-11 DIAGNOSIS — Z98.51 TUBAL LIGATION STATUS: Chronic | ICD-10-CM

## 2025-06-11 DIAGNOSIS — Z90.6 ACQUIRED ABSENCE OF OTHER PARTS OF URINARY TRACT: Chronic | ICD-10-CM

## 2025-06-11 DIAGNOSIS — Z98.890 OTHER SPECIFIED POSTPROCEDURAL STATES: Chronic | ICD-10-CM

## 2025-06-11 DIAGNOSIS — D21.9 BENIGN NEOPLASM OF CONNECTIVE AND OTHER SOFT TISSUE, UNSPECIFIED: Chronic | ICD-10-CM

## 2025-06-11 PROBLEM — I10 ESSENTIAL (PRIMARY) HYPERTENSION: Chronic | Status: ACTIVE | Noted: 2025-04-22

## 2025-06-11 PROCEDURE — 73562 X-RAY EXAM OF KNEE 3: CPT | Mod: 26,RT

## 2025-06-11 PROCEDURE — 73562 X-RAY EXAM OF KNEE 3: CPT

## 2025-06-11 PROCEDURE — 99024 POSTOP FOLLOW-UP VISIT: CPT

## 2025-08-06 ENCOUNTER — APPOINTMENT (OUTPATIENT)
Dept: ORTHOPEDIC SURGERY | Facility: CLINIC | Age: 55
End: 2025-08-06
Payer: COMMERCIAL

## 2025-08-06 ENCOUNTER — RESULT REVIEW (OUTPATIENT)
Age: 55
End: 2025-08-06

## 2025-08-06 ENCOUNTER — OUTPATIENT (OUTPATIENT)
Dept: OUTPATIENT SERVICES | Facility: HOSPITAL | Age: 55
LOS: 1 days | End: 2025-08-06
Payer: COMMERCIAL

## 2025-08-06 VITALS
OXYGEN SATURATION: 99 % | SYSTOLIC BLOOD PRESSURE: 128 MMHG | HEART RATE: 69 BPM | BODY MASS INDEX: 33.63 KG/M2 | DIASTOLIC BLOOD PRESSURE: 84 MMHG | HEIGHT: 64 IN | WEIGHT: 197 LBS

## 2025-08-06 DIAGNOSIS — Z47.1 AFTERCARE FOLLOWING JOINT REPLACEMENT SURGERY: ICD-10-CM

## 2025-08-06 DIAGNOSIS — Z98.890 OTHER SPECIFIED POSTPROCEDURAL STATES: Chronic | ICD-10-CM

## 2025-08-06 DIAGNOSIS — Z98.51 TUBAL LIGATION STATUS: Chronic | ICD-10-CM

## 2025-08-06 DIAGNOSIS — D21.9 BENIGN NEOPLASM OF CONNECTIVE AND OTHER SOFT TISSUE, UNSPECIFIED: Chronic | ICD-10-CM

## 2025-08-06 DIAGNOSIS — Z96.651 AFTERCARE FOLLOWING JOINT REPLACEMENT SURGERY: ICD-10-CM

## 2025-08-06 PROCEDURE — 99213 OFFICE O/P EST LOW 20 MIN: CPT

## 2025-08-06 PROCEDURE — 73564 X-RAY EXAM KNEE 4 OR MORE: CPT | Mod: 26,RT

## 2025-08-22 ENCOUNTER — NON-APPOINTMENT (OUTPATIENT)
Age: 55
End: 2025-08-22

## (undated) DEVICE — ADHESIVE SKIN CLOSURE FIBROIN 2.5X32CM 10/BX

## (undated) DEVICE — SUT STRATAFIX SPIRAL PDS PLUS 0 23X23CM CP-2 VIOLET

## (undated) DEVICE — NDL HYPO SAFE 22G X 1.5" (BLACK)

## (undated) DEVICE — GLV 7 PROTEXIS ORTHO (CREAM)

## (undated) DEVICE — DRSG AQUACEL 3.5 X 10"

## (undated) DEVICE — DRAPE STERI-DRAPE INCISE 32X33"

## (undated) DEVICE — SPECIMEN CONTAINER 4OZ

## (undated) DEVICE — PREP CHLORAPREP HI-LITE ORANGE 26ML

## (undated) DEVICE — POLISHER OR CAUTERY TIP

## (undated) DEVICE — SUT VICRYL 0 36" CT-1 UNDYED

## (undated) DEVICE — POSITIONER STRYKER LEG

## (undated) DEVICE — SUT STRATAFIX SPIRAL PDS PLUS 1 30X30CM OS-6 VIOLET

## (undated) DEVICE — NEPTUNE 4-PORT MANIFOLD W/ SPECIMEN COLLECTION

## (undated) DEVICE — SUT STRATAFIX SPIRAL MONOCRYL PLUS 3-0 30CM PS-1 UNDYED

## (undated) DEVICE — WARMING BLANKET UPPER ADULT

## (undated) DEVICE — PACK TOTAL KNEE

## (undated) DEVICE — WOUND IRR SURGIPHOR

## (undated) DEVICE — WOUND IRR IRRISEPT W 0.5 CHG

## (undated) DEVICE — ELCTR STRYKER NEPTUNE SMOKE EVACUATION PENCIL (GREEN)

## (undated) DEVICE — GLV 7.5 PROTEXIS ORTHO (CREAM)

## (undated) DEVICE — ORTHOALIGN PLUS UNIT

## (undated) DEVICE — DRSG COBAN 6"

## (undated) DEVICE — SUT VICRYL 2-0 27" CT-1 UNDYED

## (undated) DEVICE — DRSG DERMABOND PRINEO 22CM

## (undated) DEVICE — SUT ETHILON 3-0 18" PS-1

## (undated) DEVICE — POSITIONER FOAM EGG CRATE ULNAR 2PCS (PINK)

## (undated) DEVICE — VENODYNE/SCD SLEEVE CALF MEDIUM

## (undated) DEVICE — PREP DURAPREP 26CC

## (undated) DEVICE — DRAPE 1/2 SHEET 40X57"

## (undated) DEVICE — DRAPE 3/4 SHEET 52X76"